# Patient Record
Sex: FEMALE | Race: WHITE | NOT HISPANIC OR LATINO | Employment: UNEMPLOYED | URBAN - METROPOLITAN AREA
[De-identification: names, ages, dates, MRNs, and addresses within clinical notes are randomized per-mention and may not be internally consistent; named-entity substitution may affect disease eponyms.]

---
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
## 2024-01-08 ENCOUNTER — OFFICE VISIT (OUTPATIENT)
Dept: URGENT CARE | Facility: CLINIC | Age: 19
End: 2024-01-08
Payer: COMMERCIAL

## 2024-01-08 VITALS
TEMPERATURE: 97.8 F | RESPIRATION RATE: 18 BRPM | DIASTOLIC BLOOD PRESSURE: 61 MMHG | OXYGEN SATURATION: 98 % | SYSTOLIC BLOOD PRESSURE: 125 MMHG | HEART RATE: 85 BPM

## 2024-01-08 DIAGNOSIS — B00.1 COLD SORE: Primary | ICD-10-CM

## 2024-01-08 PROCEDURE — 99213 OFFICE O/P EST LOW 20 MIN: CPT

## 2024-01-08 RX ORDER — VALACYCLOVIR HYDROCHLORIDE 1 G/1
2000 TABLET, FILM COATED ORAL 2 TIMES DAILY
Qty: 4 TABLET | Refills: 0 | Status: SHIPPED | OUTPATIENT
Start: 2024-01-08 | End: 2024-01-09

## 2024-01-08 NOTE — PROGRESS NOTES
[facility]        NAME: Carola Gustafson is a 18 y.o. female  : 2005    MRN: [id number]  DATE: 2024  TIME: 2:45 PM    Assessment and Plan   Cold sore [B00.1]  1. Cold sore  valACYclovir (VALTREX) 1,000 mg tablet            Patient Instructions     Valacyclovir prescribed - take as directed.    Continue warm salt water gargles.    Follow-up with your PCP in 3-5 days.    Go to the ED for any severely worsening symptoms.      Chief Complaint     Chief Complaint   Patient presents with    Lip Sore     Pt states she has had a lip bump since October. No pain. Broke open on [place] and bled for 2 hours, but stopped and is increasing in size.         History of Present Illness       This is a 18-year-old female who presents with a sore to her lip that has been present since October. Seems to be getting larger. Patient states it does occasionally break open and bleed. No history of herpes labialis. No OTC treatments tried PTA.         Review of Systems   Review of Systems   Constitutional:  Negative for chills and fever.   HENT:  Positive for mouth sores. Negative for congestion, dental problem and sore throat.    Respiratory:  Negative for cough and shortness of breath.    Cardiovascular:  Negative for chest pain.   Gastrointestinal:  Negative for abdominal pain.   Neurological:  Negative for dizziness, light-headedness and headaches.         Current Medications       Current Outpatient Medications:     valACYclovir (VALTREX) 1,000 mg tablet, Take 2 tablets (2,000 mg total) by mouth 2 (two) times a day for 2 doses, Disp: 4 tablet, Rfl: 0    albuterol (ProAir HFA) 90 mcg/act inhaler, Inhale 2 puffs every 6 (six) hours as needed for wheezing or shortness of breath (Patient not taking: Reported on 2024), Disp: 8.5 g, Rfl: 0    Butalbital-APAP-Caffeine -40 MG CAPS, , Disp: , Rfl:     clobetasol propionate (CLOBEX) 0.05 % shampoo, , Disp: , Rfl:     EPINEPHrine (EPIPEN JR 2-CONCEPCION) 0.15 mg/0.3  mL SOAJ, , Disp: , Rfl:     Galcanezumab-gnlm 120 MG/ML SOAJ, Inject 120 mg under the skin every 30 (thirty) days Following the first month loading dose of 2 pens. (Patient not taking: Reported on 12/11/2023), Disp: 1 mL, Rfl: 11    methylPREDNISolone 4 MG tablet therapy pack, Use as directed on package (Patient not taking: Reported on 1/8/2024), Disp: 21 tablet, Rfl: 0    norelgestromin-ethinyl estradiol (Xulane) 150-35 MCG/24HR, APPLY ONE PATCH WEEKLY FOR 3 WEEKS (Patient not taking: Reported on 10/24/2023), Disp: , Rfl:     prochlorperazine (COMPAZINE) 10 mg tablet, Take 1 tablet (10 mg total) by mouth every 6 (six) hours as needed (Migraine) (Patient not taking: Reported on 1/8/2024), Disp: 12 tablet, Rfl: 2    rizatriptan (MAXALT) 10 mg tablet, Take 1 tablet at the onset of a migraine; may repeat once in 2 hours if needed. Max 3 days per week (Patient not taking: Reported on 12/11/2023), Disp: 12 tablet, Rfl: 2    Current Allergies     Allergies as of 01/08/2024 - Reviewed 01/08/2024   Allergen Reaction Noted    Ketorolac Hives 01/19/2018    Azithromycin  05/25/2017    Ketorocaine-l Tongue Swelling 01/25/2018    Ketorolac tromethamine Throat Swelling and Tongue Swelling 02/15/2018    Latex Hives and Blisters 01/21/2016    Medical tape Blisters 12/10/2016    Montelukast Hives and Other (See Comments) 09/11/2008    Motrin [ibuprofen] Throat Swelling 02/15/2018    Nsaids  03/07/2019    Other Other (See Comments) 09/25/2014    Sulfamethoxazole-trimethoprim GI Intolerance 09/25/2014            The following portions of the patient's history were reviewed and updated as appropriate: allergies, current medications, past family history, past medical history, past social history, past surgical history and problem list.     Past Medical History:   Diagnosis Date    ADHD (attention deficit hyperactivity disorder)     Anxiety     Asthma     Migraines        Past Surgical History:   Procedure Laterality Date     ADENOIDECTOMY      NASAL SEPTUM SURGERY      TONSILLECTOMY         No family history on file.      Medications have been verified.        Objective   /61   Pulse 85   Temp 97.8 °F (36.6 °C)   Resp 18   SpO2 98%        Physical Exam     Physical Exam  Vitals and nursing note reviewed.   Constitutional:       General: She is not in acute distress.     Appearance: She is not ill-appearing.   HENT:      Head: Normocephalic.      Mouth/Throat:      Lips: Lesions (single lesion inside right lower lip) present.      Mouth: Mucous membranes are moist.      Pharynx: Oropharynx is clear.   Cardiovascular:      Rate and Rhythm: Normal rate and regular rhythm.      Heart sounds: Normal heart sounds.   Pulmonary:      Effort: Pulmonary effort is normal.      Breath sounds: Normal breath sounds.   Musculoskeletal:         General: Normal range of motion.      Cervical back: Normal range of motion and neck supple.   Skin:     General: Skin is warm and dry.      Capillary Refill: Capillary refill takes less than 2 seconds.   Neurological:      Mental Status: She is alert and oriented to person, place, and time.

## 2024-01-08 NOTE — PATIENT INSTRUCTIONS
Valacyclovir prescribed - take as directed.    Continue warm salt water gargles.    Follow-up with your PCP in 3-5 days.    Go to the ED for any severely worsening symptoms.

## 2024-01-24 ENCOUNTER — OFFICE VISIT (OUTPATIENT)
Dept: RHEUMATOLOGY | Facility: CLINIC | Age: 19
End: 2024-01-24
Payer: COMMERCIAL

## 2024-01-24 VITALS
OXYGEN SATURATION: 99 % | HEIGHT: 63 IN | WEIGHT: 167 LBS | BODY MASS INDEX: 29.59 KG/M2 | HEART RATE: 78 BPM | SYSTOLIC BLOOD PRESSURE: 112 MMHG | DIASTOLIC BLOOD PRESSURE: 84 MMHG

## 2024-01-24 DIAGNOSIS — R76.8 POSITIVE ANA (ANTINUCLEAR ANTIBODY): ICD-10-CM

## 2024-01-24 PROCEDURE — 99204 OFFICE O/P NEW MOD 45 MIN: CPT | Performed by: INTERNAL MEDICINE

## 2024-01-24 NOTE — PROGRESS NOTES
This is a Rheumatology Consult seen at the request of Dr. Pulliam      HPI: Carola Gustafson is a 19 y/o female who presents for further evaluation low titer MARCO A 1:40. She has past medical history asthma, migraine headaches,       Per mom she was born premature and since childhood she has frequent respiratory infections with bronchitis and pneumonia.     The headaches started couple years ago.    + fatigue    + nasal stuffiness and post nasal drip    + hand tremors    Diffuse myalgias. Not persistent     Denies weakness, dysphagia to solids or liquids, no current rashes, Raynaud's, sicca symptoms, renal failure, pleural-pericardial effusion, thrombotic events          --------------------------------------------------------------------------------------------------------        ROS:        All other ROS was reviewed and negative except as above         --------------------------------------------------------------------------------------------------------    Past Medical History    Past Medical History:   Diagnosis Date    ADHD (attention deficit hyperactivity disorder)     Anxiety     Asthma     Migraines            Past Surgical History    Past Surgical History:   Procedure Laterality Date    ADENOIDECTOMY      NASAL SEPTUM SURGERY      TONSILLECTOMY             Family History     Great grandmother RA  Grandmother fibromyalgia        Social History    Social History     Tobacco Use    Smoking status: Never     Passive exposure: Yes    Smokeless tobacco: Never   Vaping Use    Vaping status: Never Used   Substance Use Topics    Alcohol use: Never    Drug use: Never     Nursing student      Allergies    Allergies   Allergen Reactions    Ketorolac Hives     Other reaction(s): Edema-Airway  Given in an Outside ED - High dose benadryl given     Azithromycin      Other reaction(s): nausea and vomiting    Ketorocaine-L Tongue Swelling     Other reaction(s): tongue swelling    Ketorolac Tromethamine Throat Swelling and Tongue  "Swelling    Latex Hives and Blisters    Medical Tape Blisters     Other reaction(s): blisters    Montelukast Hives and Other (See Comments)    Motrin [Ibuprofen] Throat Swelling    Nsaids     Other Other (See Comments)     Adhesives, gets Blisters    Sulfamethoxazole-Trimethoprim GI Intolerance         Medications    Current Outpatient Medications   Medication Instructions    albuterol (ProAir HFA) 90 mcg/act inhaler 2 puffs, Inhalation, Every 6 hours PRN    Butalbital-APAP-Caffeine -40 MG CAPS     clobetasol propionate (CLOBEX) 0.05 % shampoo No dose, route, or frequency recorded.    EPINEPHrine (EPIPEN JR 2-CONCEPCION) 0.15 mg/0.3 mL SOAJ     Galcanezumab-gnlm 120 mg, Subcutaneous, Every 30 days, Following the first month loading dose of 2 pens.    methylPREDNISolone 4 MG tablet therapy pack Use as directed on package    norelgestromin-ethinyl estradiol (Xulane) 150-35 MCG/24HR APPLY ONE PATCH WEEKLY FOR 3 WEEKS    prochlorperazine (COMPAZINE) 10 mg, Oral, Every 6 hours PRN    rizatriptan (MAXALT) 10 mg tablet Take 1 tablet at the onset of a migraine; may repeat once in 2 hours if needed. Max 3 days per week    valACYclovir (VALTREX) 2,000 mg, Oral, 2 times daily          Physical Exam    /84   Pulse 78   Ht 5' 3\" (1.6 m)   Wt 75.8 kg (167 lb)   SpO2 99%   BMI 29.58 kg/m²     GEN: AAO, No apparent distress.  Patient is well developed.  HEENT:  Pupils are equal, round and reactive.  Sclera are clear.  Fundoscopic exam is normal.  External ears are without lesions.  Oral pharynx is clear of ulcers or other lesions.  MMM.   NECK:  Supple.  There is no adenopathy appreciable in anterior or posterior cervical chains or supraclavicularly.  JVP is normal.    HEART: Regular rate and rhythm.  There is no appreciable murmur, gallop or rub.  LUNGS: Clear to auscultation.  ABD:  Soft, without tenderness, rebound or guarding.  No appreciable organomegally.  NEURO: Speech and cognition are normal.  Strength is 5/5 " throughout.  Tone is normal.  DTRs are 2/4 at the knees, ankles and elbows.  Gait is normal.  SKIN: There are no rashes or lesions    MUSCULOSKELETAL:   -Hands: normal  -Wrists: normal  -Elbows: normal  -Shoulders: normal  -Neck: normal  -Back: normal  -Hips: normal  -Knees: normal  -Ankles: normal  -Feet: normal      ________________________________________________________________________          Results Review       9/29/2023   MARCO A Titer 1 Titer of 40    MARCO A Pattern 1 Un-typeable pattern          Impressions     Migraine headaches  Recurrent respiratory infections  Low titer MARCO A      Plans:    Carola Gustafson is a 19 y/o female with h/o migraine headaches, recurrent infections  She has a low titer MARCO A 1:40  On exam no rashes, Raynaud's, swollen or tender joints, telangiectasias, purpura etc  No other s/s suggestive inflammatory arthritis/CTD  MARCO A 1:40 is usually considered negative   Would recommend evaluation per immunology with h/o recurrent infections per discretion primary provider    RTC as needed          CC:     Thank you for involving me in this patient's care.        Jayro Galvan MD  Department of Rheumatology  [facility]

## 2024-03-27 ENCOUNTER — OFFICE VISIT (OUTPATIENT)
Dept: OBGYN CLINIC | Facility: CLINIC | Age: 19
End: 2024-03-27
Payer: COMMERCIAL

## 2024-03-27 VITALS
BODY MASS INDEX: 28.75 KG/M2 | HEIGHT: 64 IN | WEIGHT: 168.4 LBS | SYSTOLIC BLOOD PRESSURE: 114 MMHG | DIASTOLIC BLOOD PRESSURE: 76 MMHG

## 2024-03-27 DIAGNOSIS — Z01.419 GYNECOLOGIC EXAM NORMAL: Primary | ICD-10-CM

## 2024-03-27 DIAGNOSIS — N89.8 VAGINAL DISCHARGE: ICD-10-CM

## 2024-03-27 PROCEDURE — 87070 CULTURE OTHR SPECIMN AEROBIC: CPT | Performed by: PHYSICIAN ASSISTANT

## 2024-03-27 PROCEDURE — S0610 ANNUAL GYNECOLOGICAL EXAMINA: HCPCS | Performed by: PHYSICIAN ASSISTANT

## 2024-03-27 NOTE — ASSESSMENT & PLAN NOTE
Pap guidelines reviewed. Will start pap smears at age 20.   Reviewed patient's prior [facility] records. Reassured patient nothing in the records or previous test results that indicate she is infertile or would have a difficult time getting pregnant. Reassuring that menses are regular at this time.   Reviewed that if there was a concern for endometriosis that it can sometimes cause scarring increasing the possibility of infertility. At this time patient has minimal tolerable cramping with menses. Will continue to monitor.   Declines birth control options will continue barrier methods at this time.   Return to office for annual or as needed.

## 2024-03-27 NOTE — PROGRESS NOTES
Assessment/Plan   Problem List Items Addressed This Visit          Obstetrics/Gynecology    Gynecologic exam normal - Primary     Pap guidelines reviewed. Will start pap smears at age 20.   Reviewed patient's prior [facility] records. Reassured patient nothing in the records or previous test results that indicate she is infertile or would have a difficult time getting pregnant. Reassuring that menses are regular at this time.   Reviewed that if there was a concern for endometriosis that it can sometimes cause scarring increasing the possibility of infertility. At this time patient has minimal tolerable cramping with menses. Will continue to monitor.   Declines birth control options will continue barrier methods at this time.   Return to office for annual or as needed.             Other    Vaginal discharge     Reviewed discharge. Reassuring no other symptoms of itching or odor. Genital culture done today.   For prevention: Recommend acidophilus or yogurt daily, avoid irritating soaps or lotions, no tight fitted pants or underwear, avoid bubble baths, do not stay in wet swimsuit.  Recommend continuing to monitor.          Relevant Orders    Genital Comprehensive Culture       Subjective:     Patient ID: Carola Gustafson is a 18 y.o. y.o. female.    HPI  17 yo seen for annual exam.   Menarche age 9. Reports had very heavy bleeding leading to anemia. Was on continuous birth control.   Has been on pills, patch, shot.   Menses are regular off birth control. 1 day of heavy bleeding and cramps and then slows down from there. First day changing tampons every 2-3 hours on the heaviest days. Last total of 5-7 days, after first day much lighter. Cramps for first day and before menses. Tolerable doesn't need to take anything for it.   Sexually active currently. Currently using NFP. Long distance while in college. Uses condoms when more sexually active.     Mother with hx of endometriosis.   Reports was told in the past that she likely  wouldn't be able to have children. Per records no indication of concern for infertility. VWB panel and other bleeding disorder work up was negative. Prior pelvic ultrasound normal. Heavy irregular bleeding was thought to be secondary to immature hypothalamic-pituitary-ovarian access and anovulatory cycles.   Last pap: N/A.   The following portions of the patient's history were reviewed and updated as appropriate: She  has a past medical history of ADHD (attention deficit hyperactivity disorder), Anemia, Anxiety, Asthma, Clotting disorder (HCC), and Migraines.  She   Patient Active Problem List    Diagnosis Date Noted    Gynecologic exam normal 03/27/2024    Vaginal discharge 03/27/2024    Chronic migraine without aura without status migrainosus, not intractable 09/10/2023    Strep pharyngitis 08/29/2023    ADHD (attention deficit hyperactivity disorder)     Asthma     Anxiety      She  has a past surgical history that includes Tonsillectomy; ADENOIDECTOMY; and Nasal septum surgery.  Her family history includes Asthma in her father and paternal grandmother; Diabetes in her maternal grandfather; Migraines in her mother and paternal grandmother; Miscarriages / Stillbirths in her maternal grandmother; Pulmonary embolism in her maternal grandmother; Seizures in her mother; Stroke in her father.  She  reports that she has never smoked. She has been exposed to tobacco smoke. She has never used smokeless tobacco. She reports that she does not drink alcohol and does not use drugs.  Current Outpatient Medications   Medication Sig Dispense Refill    albuterol (ProAir HFA) 90 mcg/act inhaler Inhale 2 puffs every 6 (six) hours as needed for wheezing or shortness of breath (Patient taking differently: Inhale 2 puffs if needed for wheezing or shortness of breath) 8.5 g 0    rizatriptan (MAXALT) 10 mg tablet Take 1 tablet at the onset of a migraine; may repeat once in 2 hours if needed. Max 3 days per week 12 tablet 2     No  "current facility-administered medications for this visit.     She is allergic to ketorolac, azithromycin, ketorocaine-l, ketorolac tromethamine, latex, medical tape, montelukast, motrin [ibuprofen], nsaids, other, and sulfamethoxazole-trimethoprim..    Menstrual History:  OB History          0    Para   0    Term   0       0    AB   0    Living   0         SAB   0    IAB   0    Ectopic   0    Multiple   0    Live Births   0                Menarche age: 9  Patient's last menstrual period was 2024 (approximate).         Review of Systems   Constitutional:  Negative for fatigue, fever and unexpected weight change.   HENT:  Negative for dental problem and sinus pressure.    Eyes:  Negative for visual disturbance.   Respiratory:  Negative for cough, shortness of breath and wheezing.    Cardiovascular:  Negative for chest pain.   Gastrointestinal:  Negative for abdominal pain, blood in stool, constipation, diarrhea, nausea and vomiting.   Endocrine: Negative for polydipsia.   Genitourinary:  Negative for difficulty urinating, dyspareunia, dysuria, frequency, hematuria, pelvic pain and urgency.   Musculoskeletal:  Negative for arthralgias and back pain.   Neurological:  Negative for dizziness, seizures, light-headedness and headaches.   Psychiatric/Behavioral:  Negative for suicidal ideas. The patient is not nervous/anxious.        Objective:  Vitals:    24 0919   BP: 114/76   BP Location: Left arm   Patient Position: Sitting   Cuff Size: Standard   Weight: 76.4 kg (168 lb 6.4 oz)   Height: 5' 4\" (1.626 m)      Physical Exam  Constitutional:       Appearance: Normal appearance. She is well-developed.   Genitourinary:      Vulva and bladder normal.      No lesions in the vagina.      Right Labia: No rash, tenderness, lesions or skin changes.     Left Labia: No tenderness, lesions, skin changes or rash.     No labial fusion noted.      No inguinal adenopathy present in the right or left side.     " No vaginal discharge, erythema, tenderness or bleeding.        Right Adnexa: not tender, not full and no mass present.     Left Adnexa: not tender, not full and no mass present.     No cervical motion tenderness, discharge or lesion.      Uterus is not enlarged, tender or irregular.      No uterine mass detected.     No urethral prolapse, tenderness or mass present.      Bladder is not tender.    Breasts:     Breasts are symmetrical.      Right: No swelling, bleeding, inverted nipple, mass, nipple discharge, skin change or tenderness.      Left: No swelling, bleeding, inverted nipple, mass, nipple discharge, skin change or tenderness.   HENT:      Head: Normocephalic and atraumatic.   Neck:      Thyroid: No thyromegaly.   Cardiovascular:      Rate and Rhythm: Normal rate and regular rhythm.      Heart sounds: Normal heart sounds. No murmur heard.     No friction rub. No gallop.   Pulmonary:      Effort: Pulmonary effort is normal. No respiratory distress.      Breath sounds: Normal breath sounds. No wheezing.   Abdominal:      General: There is no distension.      Palpations: Abdomen is soft. There is no mass.      Tenderness: There is no abdominal tenderness. There is no guarding or rebound.      Hernia: No hernia is present.   Lymphadenopathy:      Cervical: No cervical adenopathy.      Upper Body:      Right upper body: No supraclavicular, axillary or pectoral adenopathy.      Left upper body: No supraclavicular, axillary or pectoral adenopathy.      Lower Body: No right inguinal adenopathy. No left inguinal adenopathy.   Neurological:      Mental Status: She is alert and oriented to person, place, and time.   Skin:     General: Skin is warm and dry.   Psychiatric:         Behavior: Behavior normal.

## 2024-03-27 NOTE — ASSESSMENT & PLAN NOTE
Reviewed discharge. Reassuring no other symptoms of itching or odor. Genital culture done today.   For prevention: Recommend acidophilus or yogurt daily, avoid irritating soaps or lotions, no tight fitted pants or underwear, avoid bubble baths, do not stay in wet swimsuit.  Recommend continuing to monitor.

## 2024-03-31 LAB — BACTERIA GENITAL AEROBE CULT: NORMAL

## 2024-05-01 PROBLEM — Z01.419 GYNECOLOGIC EXAM NORMAL: Status: RESOLVED | Noted: 2024-03-27 | Resolved: 2024-05-01

## 2024-05-07 ENCOUNTER — TELEPHONE (OUTPATIENT)
Dept: OTHER | Facility: OTHER | Age: 19
End: 2024-05-07

## 2024-05-07 NOTE — TELEPHONE ENCOUNTER
Patient called and is asking if the office can give her a call to schedule a new patient appointment.

## 2024-05-10 ENCOUNTER — OFFICE VISIT (OUTPATIENT)
Dept: URGENT CARE | Facility: CLINIC | Age: 19
End: 2024-05-10
Payer: COMMERCIAL

## 2024-05-10 VITALS
RESPIRATION RATE: 16 BRPM | DIASTOLIC BLOOD PRESSURE: 64 MMHG | HEIGHT: 64 IN | WEIGHT: 168 LBS | OXYGEN SATURATION: 99 % | TEMPERATURE: 97.3 F | SYSTOLIC BLOOD PRESSURE: 102 MMHG | BODY MASS INDEX: 28.68 KG/M2 | HEART RATE: 83 BPM

## 2024-05-10 DIAGNOSIS — H65.06 RECURRENT ACUTE SEROUS OTITIS MEDIA OF BOTH EARS: Primary | ICD-10-CM

## 2024-05-10 PROCEDURE — S9083 URGENT CARE CENTER GLOBAL: HCPCS | Performed by: FAMILY MEDICINE

## 2024-05-10 PROCEDURE — G0382 LEV 3 HOSP TYPE B ED VISIT: HCPCS | Performed by: FAMILY MEDICINE

## 2024-05-10 RX ORDER — AMOXICILLIN AND CLAVULANATE POTASSIUM 875; 125 MG/1; MG/1
1 TABLET, FILM COATED ORAL EVERY 12 HOURS SCHEDULED
Qty: 20 TABLET | Refills: 0 | Status: SHIPPED | OUTPATIENT
Start: 2024-05-10 | End: 2024-05-20

## 2024-05-10 NOTE — PROGRESS NOTES
[facility]        NAME: Carola Gustafson is a 19 y.o. female  : 2005    MRN: [id number]  DATE: May 10, 2024  TIME: 3:21 PM    Assessment and Plan   Recurrent acute serous otitis media of both ears [H65.06]  1. Recurrent acute serous otitis media of both ears  amoxicillin-clavulanate (AUGMENTIN) 875-125 mg per tablet            Patient Instructions       Follow up with PCP in 3-5 days.  Proceed to  ER if symptoms worsen.    If tests have been performed at [facility], our office will contact you with results if changes need to be made to the care plan discussed with you at the visit.  You can review your full results on [facility].    Chief Complaint     Chief Complaint   Patient presents with    Earache     Pt reports bilateral ear pain with onset . States was seen in UC on  and prescribed Amoxicillin for Strep A and bilateral ear infection. Pt states completed antibiotic on Wednesday without resolution of ear pain.          History of Present Illness       19-year-old female with 1 to 2-week history of bilateral ear pain.  She reports recently completing a course of amoxicillin for strep pharyngitis however notes no relief of her ear pain symptoms.  She continues to experience pain and difficulty hearing of both ears.    Earache         Review of Systems   Review of Systems   Constitutional: Negative.    HENT:  Positive for ear pain.    Eyes: Negative.    Respiratory: Negative.     Cardiovascular: Negative.    Gastrointestinal: Negative.    Genitourinary: Negative.    Skin: Negative.    Allergic/Immunologic: Negative.    Neurological: Negative.    Hematological: Negative.    Psychiatric/Behavioral: Negative.           Current Medications       Current Outpatient Medications:     amoxicillin-clavulanate (AUGMENTIN) 875-125 mg per tablet, Take 1 tablet by mouth every 12 (twelve) hours for 10 days, Disp: 20 tablet, Rfl: 0    albuterol (ProAir HFA) 90 mcg/act inhaler, Inhale 2 puffs every 6  "(six) hours as needed for wheezing or shortness of breath (Patient not taking: Reported on 5/10/2024), Disp: 8.5 g, Rfl: 0    rizatriptan (MAXALT) 10 mg tablet, Take 1 tablet at the onset of a migraine; may repeat once in 2 hours if needed. Max 3 days per week (Patient not taking: Reported on 5/10/2024), Disp: 12 tablet, Rfl: 2    Current Allergies     Allergies as of 05/10/2024 - Reviewed 05/10/2024   Allergen Reaction Noted    Ketorolac Hives 01/19/2018    Azithromycin  05/25/2017    Ketorocaine-l Tongue Swelling 01/25/2018    Ketorolac tromethamine Throat Swelling and Tongue Swelling 02/15/2018    Latex Hives and Blisters 01/21/2016    Medical tape Blisters 12/10/2016    Montelukast Hives and Other (See Comments) 09/11/2008    Motrin [ibuprofen] Throat Swelling 02/15/2018    Nsaids  03/07/2019    Other Other (See Comments) 09/25/2014    Sulfamethoxazole-trimethoprim GI Intolerance 09/25/2014            The following portions of the patient's history were reviewed and updated as appropriate: allergies, current medications, past family history, past medical history, past social history, past surgical history and problem list.     Past Medical History:   Diagnosis Date    ADHD (attention deficit hyperactivity disorder)     Anemia     Anxiety     Asthma     Clotting disorder (HCC)     Migraines        Past Surgical History:   Procedure Laterality Date    ADENOIDECTOMY      NASAL SEPTUM SURGERY      TONSILLECTOMY         Family History   Problem Relation Age of Onset    Migraines Mother     Seizures Mother     Asthma Father     Stroke Father     Diabetes Maternal Grandfather     Pulmonary embolism Maternal Grandmother     Miscarriages / Stillbirths Maternal Grandmother     Asthma Paternal Grandmother     Migraines Paternal Grandmother          Medications have been verified.        Objective   /64 (BP Location: Right arm, Patient Position: Sitting)   Pulse 83   Temp (!) 97.3 °F (36.3 °C)   Resp 16   Ht 5' 4\" " (1.626 m)   Wt 76.2 kg (168 lb)   SpO2 99%   BMI 28.84 kg/m²   No LMP recorded.       Physical Exam     Physical Exam  Vitals and nursing note reviewed.   Constitutional:       Appearance: She is well-developed.   HENT:      Head: Normocephalic.      Right Ear: Tympanic membrane is injected.      Left Ear: Tympanic membrane is injected.      Nose: Nose normal.      Mouth/Throat:      Pharynx: No oropharyngeal exudate or posterior oropharyngeal erythema.   Eyes:      Pupils: Pupils are equal, round, and reactive to light.   Cardiovascular:      Rate and Rhythm: Normal rate.   Pulmonary:      Effort: Pulmonary effort is normal.   Abdominal:      General: Abdomen is flat.   Musculoskeletal:         General: Normal range of motion.      Cervical back: Normal range of motion.   Skin:     General: Skin is warm and dry.   Neurological:      Mental Status: She is alert and oriented to person, place, and time.

## 2024-06-09 PROBLEM — H65.06 RECURRENT ACUTE SEROUS OTITIS MEDIA OF BOTH EARS: Status: RESOLVED | Noted: 2024-05-10 | Resolved: 2024-06-09

## 2024-06-13 ENCOUNTER — OFFICE VISIT (OUTPATIENT)
Dept: URGENT CARE | Facility: CLINIC | Age: 19
End: 2024-06-13
Payer: COMMERCIAL

## 2024-06-13 VITALS
SYSTOLIC BLOOD PRESSURE: 110 MMHG | RESPIRATION RATE: 16 BRPM | HEART RATE: 84 BPM | TEMPERATURE: 99.4 F | OXYGEN SATURATION: 100 % | DIASTOLIC BLOOD PRESSURE: 73 MMHG

## 2024-06-13 DIAGNOSIS — H70.91 MASTOIDITIS OF RIGHT SIDE: Primary | ICD-10-CM

## 2024-06-13 PROCEDURE — S9083 URGENT CARE CENTER GLOBAL: HCPCS | Performed by: FAMILY MEDICINE

## 2024-06-13 PROCEDURE — G0382 LEV 3 HOSP TYPE B ED VISIT: HCPCS | Performed by: FAMILY MEDICINE

## 2024-06-13 RX ORDER — METHYLPREDNISOLONE 4 MG/1
TABLET ORAL
Qty: 21 TABLET | Refills: 0 | Status: SHIPPED | OUTPATIENT
Start: 2024-06-13

## 2024-06-13 RX ORDER — AMOXICILLIN AND CLAVULANATE POTASSIUM 875; 125 MG/1; MG/1
1 TABLET, FILM COATED ORAL EVERY 12 HOURS SCHEDULED
Qty: 20 TABLET | Refills: 0 | Status: SHIPPED | OUTPATIENT
Start: 2024-06-13 | End: 2024-06-23

## 2024-06-13 NOTE — PROGRESS NOTES
[facility]        NAME: Carola Gustafson is a 19 y.o. female  : 2005    MRN: [id number]  DATE: 2024  TIME: 3:02 PM    Assessment and Plan   Mastoiditis of right side [H70.91]  1. Mastoiditis of right side  amoxicillin-clavulanate (AUGMENTIN) 875-125 mg per tablet    methylPREDNISolone 4 MG tablet therapy pack            Patient Instructions       Follow up with PCP in 3-5 days.  Proceed to  ER if symptoms worsen.    If tests have been performed at [facility], our office will contact you with results if changes need to be made to the care plan discussed with you at the visit.  [facility]    Chief Complaint     Chief Complaint   Patient presents with    Earache     B/l ear pain and sore throat x7 days.          History of Present Illness       19-year-old female presenting with recurrent bilateral ear pain for the past 1 week.  Pain in her right ear seems to be worse and does radiate to the posterior aspect of the right ear.  She also notes of a sore throat and some pain with swallowing.  Denies any fevers or chills.        Review of Systems   Review of Systems   Constitutional: Negative.    HENT:  Positive for ear pain and sore throat.    Eyes: Negative.    Respiratory: Negative.     Cardiovascular: Negative.    Gastrointestinal: Negative.    Genitourinary: Negative.    Musculoskeletal:  Positive for myalgias.   Skin: Negative.    Allergic/Immunologic: Negative.    Neurological: Negative.    Hematological: Negative.    Psychiatric/Behavioral: Negative.           Current Medications       Current Outpatient Medications:     amoxicillin-clavulanate (AUGMENTIN) 875-125 mg per tablet, Take 1 tablet by mouth every 12 (twelve) hours for 10 days, Disp: 20 tablet, Rfl: 0    methylPREDNISolone 4 MG tablet therapy pack, Use as directed on package, Disp: 21 tablet, Rfl: 0    albuterol (ProAir HFA) 90 mcg/act inhaler, Inhale 2 puffs every 6 (six) hours as needed  for wheezing or shortness of breath (Patient not taking: Reported on 5/10/2024), Disp: 8.5 g, Rfl: 0    rizatriptan (MAXALT) 10 mg tablet, Take 1 tablet at the onset of a migraine; may repeat once in 2 hours if needed. Max 3 days per week (Patient not taking: Reported on 5/10/2024), Disp: 12 tablet, Rfl: 2    Current Allergies     Allergies as of 06/13/2024 - Reviewed 06/13/2024   Allergen Reaction Noted    Ketorolac Hives 01/19/2018    Azithromycin  05/25/2017    Ketorocaine-l Tongue Swelling 01/25/2018    Ketorolac tromethamine Throat Swelling and Tongue Swelling 02/15/2018    Latex Hives and Blisters 01/21/2016    Medical tape Blisters 12/10/2016    Montelukast Hives and Other (See Comments) 09/11/2008    Motrin [ibuprofen] Throat Swelling 02/15/2018    Nsaids  03/07/2019    Other Other (See Comments) 09/25/2014    Sulfamethoxazole-trimethoprim GI Intolerance 09/25/2014            The following portions of the patient's history were reviewed and updated as appropriate: allergies, current medications, past family history, past medical history, past social history, past surgical history and problem list.     Past Medical History:   Diagnosis Date    ADHD (attention deficit hyperactivity disorder)     Anemia     Anxiety     Asthma     Clotting disorder (HCC)     Migraines        Past Surgical History:   Procedure Laterality Date    ADENOIDECTOMY      NASAL SEPTUM SURGERY      TONSILLECTOMY         Family History   Problem Relation Age of Onset    Migraines Mother     Seizures Mother     Asthma Father     Stroke Father     Diabetes Maternal Grandfather     Pulmonary embolism Maternal Grandmother     Miscarriages / Stillbirths Maternal Grandmother     Asthma Paternal Grandmother     Migraines Paternal Grandmother          Medications have been verified.        Objective   /73   Pulse 84   Temp 99.4 °F (37.4 °C)   Resp 16   SpO2 100%   No LMP recorded.       Physical Exam     Physical Exam  Vitals and nursing  note reviewed.   Constitutional:       Appearance: She is well-developed.   HENT:      Head: Normocephalic.      Right Ear: Tympanic membrane is erythematous. Tympanic membrane is not bulging.      Left Ear: Tympanic membrane is not erythematous or bulging.      Nose: Nose normal.   Eyes:      Pupils: Pupils are equal, round, and reactive to light.   Cardiovascular:      Rate and Rhythm: Normal rate.   Pulmonary:      Effort: Pulmonary effort is normal.   Abdominal:      General: Abdomen is flat.   Musculoskeletal:         General: Normal range of motion.      Cervical back: Normal range of motion.   Skin:     General: Skin is warm and dry.   Neurological:      Mental Status: She is alert and oriented to person, place, and time.

## 2024-06-25 PROBLEM — N89.8 VAGINAL DISCHARGE: Status: RESOLVED | Noted: 2024-03-27 | Resolved: 2024-06-25

## 2024-06-25 PROBLEM — J02.0 STREP PHARYNGITIS: Status: RESOLVED | Noted: 2023-08-29 | Resolved: 2024-06-25

## 2024-09-26 ENCOUNTER — OFFICE VISIT (OUTPATIENT)
Dept: FAMILY MEDICINE CLINIC | Facility: HOSPITAL | Age: 19
End: 2024-09-26
Payer: COMMERCIAL

## 2024-09-26 ENCOUNTER — TELEPHONE (OUTPATIENT)
Age: 19
End: 2024-09-26

## 2024-09-26 VITALS
OXYGEN SATURATION: 99 % | DIASTOLIC BLOOD PRESSURE: 70 MMHG | TEMPERATURE: 98.2 F | WEIGHT: 175 LBS | HEIGHT: 64 IN | SYSTOLIC BLOOD PRESSURE: 114 MMHG | HEART RATE: 99 BPM | BODY MASS INDEX: 29.88 KG/M2

## 2024-09-26 DIAGNOSIS — Z76.89 ESTABLISHING CARE WITH NEW DOCTOR, ENCOUNTER FOR: Primary | ICD-10-CM

## 2024-09-26 DIAGNOSIS — F41.9 ANXIETY: ICD-10-CM

## 2024-09-26 DIAGNOSIS — G43.709 CHRONIC MIGRAINE WITHOUT AURA WITHOUT STATUS MIGRAINOSUS, NOT INTRACTABLE: ICD-10-CM

## 2024-09-26 DIAGNOSIS — J45.31 MILD PERSISTENT ASTHMA WITH ACUTE EXACERBATION: ICD-10-CM

## 2024-09-26 DIAGNOSIS — Z13.0 SCREENING FOR DEFICIENCY ANEMIA: ICD-10-CM

## 2024-09-26 DIAGNOSIS — E55.9 VITAMIN D DEFICIENCY: ICD-10-CM

## 2024-09-26 DIAGNOSIS — E53.8 VITAMIN B12 DEFICIENCY: ICD-10-CM

## 2024-09-26 PROBLEM — F42.2 MIXED OBSESSIONAL THOUGHTS AND ACTS: Status: ACTIVE | Noted: 2024-09-26

## 2024-09-26 PROCEDURE — 99203 OFFICE O/P NEW LOW 30 MIN: CPT | Performed by: NURSE PRACTITIONER

## 2024-09-26 RX ORDER — PREDNISONE 10 MG/1
TABLET ORAL
Qty: 18 TABLET | Refills: 0 | Status: SHIPPED | OUTPATIENT
Start: 2024-09-26 | End: 2024-10-05

## 2024-09-26 RX ORDER — FLUOXETINE 10 MG/1
10 TABLET, FILM COATED ORAL DAILY
Qty: 90 TABLET | Refills: 3 | Status: SHIPPED | OUTPATIENT
Start: 2024-09-26

## 2024-09-26 RX ORDER — ALBUTEROL SULFATE 2.5 MG/3ML
2.5 SOLUTION RESPIRATORY (INHALATION) EVERY 6 HOURS PRN
COMMUNITY

## 2024-09-26 RX ORDER — HYDROXYZINE HYDROCHLORIDE 10 MG/1
10 TABLET, FILM COATED ORAL EVERY 6 HOURS PRN
Qty: 30 TABLET | Refills: 0 | Status: SHIPPED | OUTPATIENT
Start: 2024-09-26

## 2024-09-26 RX ORDER — ALBUTEROL SULFATE 90 UG/1
2 INHALANT RESPIRATORY (INHALATION) EVERY 6 HOURS PRN
Qty: 8.5 G | Refills: 0 | Status: SHIPPED | OUTPATIENT
Start: 2024-09-26

## 2024-09-26 NOTE — ASSESSMENT & PLAN NOTE
Hx asthma; mild with suspected reactive airway component as she is only on albuterol inhaler/neb prn.  Do not have prior records.  Unclear when last PFT was completed.      Sinus pressure/pain for the last week and feels like it is going into her chest.  Fever for a day then none.  No change in appetite.  Cough NP.  No sick contacts.  +paroxysmal dyspnea.    Using nebulizer, dayquil at first but now just nebulizer. No recent tylenol/motrin use.   -lungs CTA however tight. No acute distress.   No cough noted on exam.  Does have PND and mild ear pain with tragus pull however no s/s mastoiditis.  Given paroxysmal dyspnea will treat with prednisone taper.    -will ask for PFT once acute illness resolved.

## 2024-09-26 NOTE — ASSESSMENT & PLAN NOTE
Hx migraines with photophobia/phonophobia.  Well controlled; denies migraine for the past 2-3 months.  She takes B12 and magnesium for prophylaxis.  Has maxalt with tylenol as she has an allergy to NSAIDs and sleeps for abortive therapy.    -followed by neurology

## 2024-09-26 NOTE — PROGRESS NOTES
"  History of Present Illness   Well Adult Physical   Patient here for a comprehensive physical exam.    HPI      Diet and Physical Activity  Diet: {diet; well adult:23777}  Weight concerns: {weight concerns; well adult:86854}  Exercise: {exericse; well adult:01118}   EtOH: {rare/occasional/daily/social/none:80722::\"***\":1}     Depression Screen  PHQ-2/9 Depression Screening    Little interest or pleasure in doing things: 0 - not at all  Feeling down, depressed, or hopeless: 1 - several days          General Health  Hearing: {WELL ADULT HEARIN}  Vision: {vision; well adult:95965}  Dental: {dental; well adult:89060}     History:  LMP: No LMP recorded.  Menopause at *** years  : ***  Para: ***    Cancer Screening  Colononoscopy ***  Mammogram ***   Pap ***  Abnormal pap? {yes/no:9010}  Smoker *** Annual screening with low-dose helical computed tomography (CT) for patients age 55 to 74 years with history of smoking at least 30 pack-years and, if a former smoker, had quit within the previous 15 years         The following portions of the patient's history were reviewed and updated as appropriate: allergies, current medications, past family history, past medical history, past social history, past surgical history and problem list.    Review of Systems     Review of Systems   Constitutional: Negative.  Negative for activity change, appetite change, chills, fatigue and fever.   HENT:  Positive for congestion, postnasal drip and sinus pressure. Negative for ear pain, sinus pain and sore throat.    Eyes: Negative.    Respiratory:  Positive for cough, chest tightness, shortness of breath and wheezing.    Cardiovascular: Negative.  Negative for chest pain and leg swelling.   Gastrointestinal: Negative.  Negative for constipation and diarrhea.   Endocrine: Negative.    Genitourinary: Negative.  Negative for dysuria.   Musculoskeletal: Negative.    Skin: Negative.    Allergic/Immunologic: Negative.  Negative for " immunocompromised state.   Neurological: Negative.  Negative for dizziness and light-headedness.   Hematological: Negative.    Psychiatric/Behavioral:  Positive for sleep disturbance.        Past Medical History     Past Medical History:   Diagnosis Date   • ADHD (attention deficit hyperactivity disorder)    • Anemia    • Anxiety    • Asthma    • Clotting disorder (HCC)    • Migraines        Past Surgical History     Past Surgical History:   Procedure Laterality Date   • ADENOIDECTOMY     • NASAL SEPTUM SURGERY     • TONSILLECTOMY         Social History     Social History     Socioeconomic History   • Marital status: Single     Spouse name: Not on file   • Number of children: Not on file   • Years of education: Not on file   • Highest education level: Not on file   Occupational History   • Not on file   Tobacco Use   • Smoking status: Never     Passive exposure: Yes   • Smokeless tobacco: Never   Vaping Use   • Vaping status: Never Used   Substance and Sexual Activity   • Alcohol use: Never   • Drug use: Never   • Sexual activity: Yes     Partners: Male     Birth control/protection: Rhythm   Other Topics Concern   • Not on file   Social History Narrative   • Not on file     Social Determinants of Health     Financial Resource Strain: Not on file   Food Insecurity: Not on file   Transportation Needs: Not on file   Physical Activity: Not on file   Stress: Not on file   Social Connections: Unknown (6/18/2024)    Received from [facility]    Social Connections    • How often do you feel lonely or isolated from those around you? (Adult - for ages 18 years and over): Not on file   Intimate Partner Violence: Not on file   Housing Stability: Not on file       Family History     Family History   Problem Relation Age of Onset   • Migraines Mother    • Seizures Mother    • Asthma Father    • Stroke Father    • Diabetes Maternal Grandfather    • Pulmonary embolism Maternal Grandmother    • Miscarriages / Stillbirths Maternal  Grandmother    • Asthma Paternal Grandmother    • Migraines Paternal Grandmother        Current Medications       Current Outpatient Medications:   •  albuterol (ProAir HFA) 90 mcg/act inhaler, Inhale 2 puffs every 6 (six) hours as needed for wheezing or shortness of breath (Patient not taking: Reported on 5/10/2024), Disp: 8.5 g, Rfl: 0  •  methylPREDNISolone 4 MG tablet therapy pack, Use as directed on package, Disp: 21 tablet, Rfl: 0  •  rizatriptan (MAXALT) 10 mg tablet, Take 1 tablet at the onset of a migraine; may repeat once in 2 hours if needed. Max 3 days per week (Patient not taking: Reported on 5/10/2024), Disp: 12 tablet, Rfl: 2     Allergies     Allergies   Allergen Reactions   • Ketorolac Hives     Other reaction(s): Edema-Airway  Given in an Outside ED - High dose benadryl given    • Azithromycin      Other reaction(s): nausea and vomiting   • Ketorocaine-L Tongue Swelling     Other reaction(s): tongue swelling   • Ketorolac Tromethamine Throat Swelling and Tongue Swelling   • Latex Hives and Blisters   • Medical Tape Blisters     Other reaction(s): blisters   • Montelukast Hives and Other (See Comments)   • Motrin [Ibuprofen] Throat Swelling   • Nsaids    • Other Other (See Comments)     Adhesives, gets Blisters   • Sulfamethoxazole-Trimethoprim GI Intolerance       Objective     There were no vitals taken for this visit.  Wt Readings from Last 3 Encounters:   05/10/24 76.2 kg (168 lb)   03/27/24 76.4 kg (168 lb 6.4 oz) (91%, Z= 1.34)*   01/24/24 75.8 kg (167 lb) (91%, Z= 1.31)*     * Growth percentiles are based on CDC (Girls, 2-20 Years) data.     BP Readings from Last 3 Encounters:   06/13/24 110/73   05/10/24 102/64   03/27/24 114/76     Pulse Readings from Last 3 Encounters:   06/13/24 84   05/10/24 83   01/24/24 78     There is no height or weight on file to calculate BMI.     Physical Exam  Vitals and nursing note reviewed.   Constitutional:       Appearance: Normal appearance.   HENT:       Head: Normocephalic and atraumatic.      Right Ear: Tympanic membrane, ear canal and external ear normal.      Left Ear: Tympanic membrane, ear canal and external ear normal.      Nose: Nose normal.      Mouth/Throat:      Mouth: Mucous membranes are moist.      Pharynx: Oropharynx is clear.   Eyes:      Extraocular Movements: Extraocular movements intact.      Conjunctiva/sclera: Conjunctivae normal.      Pupils: Pupils are equal, round, and reactive to light.   Cardiovascular:      Rate and Rhythm: Normal rate and regular rhythm.      Pulses: Normal pulses.      Heart sounds: Normal heart sounds.   Pulmonary:      Effort: Pulmonary effort is normal.      Breath sounds: Normal breath sounds.   Abdominal:      General: Bowel sounds are normal.      Palpations: Abdomen is soft.   Musculoskeletal:         General: Normal range of motion.      Cervical back: Normal range of motion and neck supple.   Skin:     General: Skin is warm and dry.   Neurological:      General: No focal deficit present.      Mental Status: She is alert and oriented to person, place, and time.   Psychiatric:         Mood and Affect: Mood normal.         Behavior: Behavior normal.         Thought Content: Thought content normal.         Judgment: Judgment normal.         No results found.    Health Maintenance     Health Maintenance   Topic Date Due   • Hepatitis C Screening  Never done   • Pneumococcal Vaccine: Pediatrics (0 to 5 Years) and At-Risk Patients (6 to 64 Years) (1 of 2 - PCV) Never done   • DTaP,Tdap,and Td Vaccines (1 - Tdap) Never done   • Depression Screening  Never done   • HIV Screening  Never done   • HPV Vaccine (1 - 3-dose series) Never done   • Chlamydia Screening  01/13/2021   • Annual Physical  Never done   • OT PLAN OF CARE  12/27/2023   • COVID-19 Vaccine (3 - 2023-24 season) 09/01/2024   • Influenza Vaccine (1) 09/01/2024   • Zoster Vaccine (1 of 2) 04/10/2054   • RSV Vaccine Age 60+ Years (1 - 1-dose 60+ series)  "04/10/2064   • Meningococcal ACWY Vaccine  Completed   • RSV Vaccine age 0-20 Months  Aged Out   • HIB Vaccine  Aged Out   • IPV Vaccine  Aged Out   • Hepatitis A Vaccine  Aged Out     Immunization History   Administered Date(s) Administered   • COVID-19 PFIZER VACCINE 0.3 ML IM 05/07/2021, 05/28/2021   • INFLUENZA 08/23/2016, 09/24/2018, 10/28/2019, 12/27/2021   • Meningococcal MCV4, Unspecified 08/24/2021       Laboratory Results:   Lab Results   Component Value Date    WBC 5.55 09/29/2023    WBC 10.47 (H) 05/22/2023    WBC 8.88 10/23/2021    HGB 12.7 09/29/2023    HGB 11.8 05/22/2023    HGB 13.1 10/23/2021    HCT 40.8 09/29/2023    HCT 37.5 05/22/2023    HCT 41.7 10/23/2021    MCV 85 09/29/2023    MCV 86 05/22/2023    MCV 87 10/23/2021     09/29/2023     05/22/2023     10/23/2021     Lab Results   Component Value Date    BUN 10 09/29/2023    BUN 13 05/22/2023    BUN 13 10/23/2021     Lab Results   Component Value Date    GLUC 107 05/22/2023    GLUC 83 10/23/2021    GLUC 91 10/19/2021    ALT 21 09/29/2023    ALT 7 05/22/2023    ALT 23 10/23/2021    AST 19 09/29/2023    AST 13 05/22/2023    AST 10 10/23/2021     No results found for: \"TSH\"  No results found for: \"A1C\"    Lipid Profile:   No results found for: \"CHOL\"  No results found for: \"HDL\"  No results found for: \"LDLC\"  No results found for: \"LDLCALC\"  No results found for: \"TRIG\"      Assessment/Plan   {There are no diagnoses linked to this encounter. (Refresh or delete this SmartLink)}    1. Healthy female exam.  2. Patient Counseling:   { Adult CPE Counseling List:02526}    Immunizations reviewed. ***  Discussed benefits of screening ***.  Discussed the patient's BMI with her.  The BMI {BMI plan ([facility] measure 421):59144}  3. Cancer Screening ***  4. Labs ***  5. ***  6. Follow up {follow-up interval:26819}.    TORIBIO Mendieta    "

## 2024-09-26 NOTE — PATIENT INSTRUCTIONS
Obtain medical records from prior PCP  Bring supplements (picture or bottle) to follow up.   Fluoxetine 10mg daily.  Use hydroxyzine for panic attacks.   Prednisone taper for exacerbation of asthma.   Use of inhaler with spacer.   Obtain fasting bloodwork.  No caffeine/food for eight hours prior.  Drink plenty of water.   Complete PFT

## 2024-09-26 NOTE — PROGRESS NOTES
"[facility]   Kayli ROONEY    Assessment/Plan:   1. Establishing care with new doctor, encounter for  2. Mild persistent asthma with acute exacerbation  Assessment & Plan:  Hx asthma; mild with suspected reactive airway component as she is only on albuterol inhaler/neb prn.  Do not have prior records.  Unclear when last PFT was completed.      Sinus pressure/pain for the last week and feels like it is going into her chest.  Fever for a day then none.  No change in appetite.  Cough NP.  No sick contacts.  +paroxysmal dyspnea.    Using nebulizer, dayquil at first but now just nebulizer. No recent tylenol/motrin use.   -lungs CTA however tight. No acute distress.   No cough noted on exam.  Does have PND and mild ear pain with tragus pull however no s/s mastoiditis.  Given paroxysmal dyspnea will treat with prednisone taper.    -will ask for PFT once acute illness resolved.   Orders:  -     albuterol (ProAir HFA) 90 mcg/act inhaler; Inhale 2 puffs every 6 (six) hours as needed for wheezing or shortness of breath  -     Complete PFT with post bronchodilator; Future  -     Spacer/Aero-Holding Chambers NIRU; Use in the morning  -     predniSONE 10 mg tablet; Take 3 tablets (30 mg total) by mouth daily for 3 days, THEN 2 tablets (20 mg total) daily for 3 days, THEN 1 tablet (10 mg total) daily for 3 days.  3. Anxiety  Assessment & Plan:  KAL 11    Started seeing a therapist for anxiety and noted to have OCD tendencies.  Reports she was on fluoxetine in the past but stopped it.  Did help symptoms.  Notes anxiety affects sleep as she struggles to \"shut it off\".  Gets about 4-5 hours a night of rest.    Works at [employer]; drinks 1 caffeine drink daily.  Balanced diet.  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   Likes to hike for physical activity/stress reduction.   -discussed and agreeable to restarting fluoxetine 10mg daily.  Will use hydroxyzine as needed for panic " attacks.   -continue CBT    Orders:  -     FLUoxetine 10 MG tablet; Take 1 tablet (10 mg total) by mouth daily  -     Comprehensive metabolic panel; Future  -     hydrOXYzine HCL (ATARAX) 10 mg tablet; Take 1 tablet (10 mg total) by mouth every 6 (six) hours as needed for anxiety  4. Screening for deficiency anemia  -     CBC and differential; Future  5. Vitamin D deficiency  -     Vitamin D 25 hydroxy; Future  6. Vitamin B12 deficiency  -     Vitamin B12; Future  7. Chronic migraine without aura without status migrainosus, not intractable  Assessment & Plan:  Hx migraines with photophobia/phonophobia.  Well controlled; denies migraine for the past 2-3 months.  She takes B12 and magnesium for prophylaxis.  Has maxalt with tylenol as she has an allergy to NSAIDs and sleeps for abortive therapy.    -followed by neurology      Return for Annual physical, F/U after bloodwork, F/U after Imaging.  Patient may call or return to office with any questions or concerns.     ______________________________________________________________________  Subjective:     Patient ID: Carola Gustafson is a 19 y.o. female.  Carola Gustafson  Chief Complaint   Patient presents with   • Establish Care     Here to establish care; former patient of Mateo.  Unclear when last OV was.    PMH: anxiety, asthma, migraines.  Reports hx ADHD as a child (2yr old) however did well in school and stopped medications at some point, did have an eating disorder.    Not currently  on BC.  Currently sexually active with one and only BF; uses fertility awareness method.        Unclear dosing of supplements.  Will bring bottle or dosage in for next OV.   magnesium  MVI  B2  B12  Probiotic  Vitamin D          Depression Screening and Follow-up Plan: Patient was screened for depression during today's encounter. They screened negative with a PHQ-2 score of 1.      The following portions of the patient's history were reviewed and updated as appropriate: allergies,  current medications, past family history, past medical history, past social history, past surgical history, and problem list.    Review of Systems   Constitutional: Negative.  Negative for activity change, appetite change, chills, fatigue and fever.   HENT:  Positive for congestion, ear pain, postnasal drip and sinus pressure. Negative for sinus pain and sore throat.    Eyes: Negative.    Respiratory:  Positive for cough, chest tightness, shortness of breath and wheezing.    Cardiovascular: Negative.  Negative for chest pain and leg swelling.   Gastrointestinal: Negative.  Negative for constipation and diarrhea.   Endocrine: Negative.    Genitourinary: Negative.  Negative for dysuria and menstrual problem.   Musculoskeletal: Negative.    Skin: Negative.    Allergic/Immunologic: Negative.  Negative for immunocompromised state.   Neurological:  Negative for dizziness and light-headedness.   Hematological: Negative.    Psychiatric/Behavioral:  Positive for sleep disturbance. The patient is nervous/anxious.          Objective:      Vitals:    09/26/24 1429   BP: 114/70   Pulse: 99   Temp: 98.2 °F (36.8 °C)   SpO2: 99%      Physical Exam  Vitals and nursing note reviewed.   Constitutional:       Appearance: Normal appearance.   HENT:      Head: Normocephalic and atraumatic.      Right Ear: Ear canal and external ear normal. A middle ear effusion is present.      Left Ear: Ear canal and external ear normal. A middle ear effusion is present.      Nose: Congestion and rhinorrhea present. Rhinorrhea is clear.      Mouth/Throat:      Mouth: Mucous membranes are moist.      Pharynx: Oropharynx is clear. Postnasal drip present.   Eyes:      Extraocular Movements: Extraocular movements intact.      Conjunctiva/sclera: Conjunctivae normal.      Pupils: Pupils are equal, round, and reactive to light.   Cardiovascular:      Rate and Rhythm: Normal rate and regular rhythm.      Pulses: Normal pulses.      Heart sounds: Normal heart  "sounds.   Pulmonary:      Effort: Pulmonary effort is normal.      Breath sounds: Normal breath sounds. No wheezing, rhonchi or rales.   Abdominal:      General: Bowel sounds are normal.      Palpations: Abdomen is soft.   Musculoskeletal:         General: Normal range of motion.      Cervical back: Normal range of motion and neck supple.      Right lower leg: No edema.      Left lower leg: No edema.   Skin:     General: Skin is warm and dry.      Coloration: Skin is pale.   Neurological:      General: No focal deficit present.      Mental Status: She is alert and oriented to person, place, and time.   Psychiatric:         Mood and Affect: Mood is anxious.         Speech: Speech normal.         Behavior: Behavior normal. Behavior is cooperative.         Thought Content: Thought content normal.         Judgment: Judgment normal.         Portions of the record may have been created with voice recognition software. Occasional wrong word or \"sound alike\" substitutions may have occurred due to the inherent limitations of voice recognition software. Please review the chart carefully and recognize, using context, where substitutions/typographical errors may have occurred.       "

## 2024-09-26 NOTE — TELEPHONE ENCOUNTER
Patient is calling to inform provider that her prescription for FLUoxetine 10 MG tablet the pharmacy is requiring a PA or an alternative  Please review and advise

## 2024-09-26 NOTE — ASSESSMENT & PLAN NOTE
"KAL 11    Started seeing a therapist for anxiety and noted to have OCD tendencies.  Reports she was on fluoxetine in the past but stopped it.  Did help symptoms.  Notes anxiety affects sleep as she struggles to \"shut it off\".  Gets about 4-5 hours a night of rest.    Works at [employer]; drinks 1 caffeine drink daily.  Balanced diet.  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   Likes to hike for physical activity/stress reduction.   -discussed and agreeable to restarting fluoxetine 10mg daily.  Will use hydroxyzine as needed for panic attacks.   -continue CBT    "

## 2024-09-30 NOTE — TELEPHONE ENCOUNTER
PA for FLUoxetine 10 MG tablet SUBMITTED     via    [][facility]-KEY:   [x]Ricki-Case ID #: [id number]   []Faxed to plan   []Other website   []Phone call Case ID #     Office notes sent, clinical questions answered. Awaiting determination    Turnaround time for your insurance to make a decision on your Prior Authorization can take 7-21 business days.

## 2024-10-01 NOTE — TELEPHONE ENCOUNTER
PA for FLUoxetine 10 MG tablet  APPROVED     Date(s) approved August 31, 2024 to September 30, 2025     Case # [id number]     Patient advised by          []Bancore A/Shart Message  []Phone call   [x]LMOM - cell phone voicemail  []L/M to call office as no active Communication consent on file  []Unable to leave detailed message as VM not approved on Communication consent       Pharmacy advised by    [x]Fax  []Phone call    Approval letter scanned into Media No documentation not available at time of decision

## 2024-10-09 ENCOUNTER — APPOINTMENT (OUTPATIENT)
Dept: LAB | Facility: HOSPITAL | Age: 19
End: 2024-10-09
Payer: COMMERCIAL

## 2024-10-09 DIAGNOSIS — Z13.0 SCREENING FOR DEFICIENCY ANEMIA: ICD-10-CM

## 2024-10-09 DIAGNOSIS — E53.8 VITAMIN B12 DEFICIENCY: ICD-10-CM

## 2024-10-09 DIAGNOSIS — F41.9 ANXIETY: ICD-10-CM

## 2024-10-09 DIAGNOSIS — E55.9 VITAMIN D DEFICIENCY: ICD-10-CM

## 2024-10-09 LAB
25(OH)D3 SERPL-MCNC: 38.2 NG/ML (ref 30–100)
ALBUMIN SERPL BCG-MCNC: 4.4 G/DL (ref 3.5–5)
ALP SERPL-CCNC: 83 U/L (ref 34–104)
ALT SERPL W P-5'-P-CCNC: 15 U/L (ref 7–52)
ANION GAP SERPL CALCULATED.3IONS-SCNC: 8 MMOL/L (ref 4–13)
AST SERPL W P-5'-P-CCNC: 16 U/L (ref 13–39)
BASOPHILS # BLD AUTO: 0.06 THOUSANDS/ΜL (ref 0–0.1)
BASOPHILS NFR BLD AUTO: 1 % (ref 0–1)
BILIRUB SERPL-MCNC: 0.54 MG/DL (ref 0.2–1)
BUN SERPL-MCNC: 13 MG/DL (ref 5–25)
CALCIUM SERPL-MCNC: 9.5 MG/DL (ref 8.4–10.2)
CHLORIDE SERPL-SCNC: 105 MMOL/L (ref 96–108)
CO2 SERPL-SCNC: 24 MMOL/L (ref 21–32)
CREAT SERPL-MCNC: 0.74 MG/DL (ref 0.6–1.3)
EOSINOPHIL # BLD AUTO: 0.09 THOUSAND/ΜL (ref 0–0.61)
EOSINOPHIL NFR BLD AUTO: 1 % (ref 0–6)
ERYTHROCYTE [DISTWIDTH] IN BLOOD BY AUTOMATED COUNT: 13.3 % (ref 11.6–15.1)
GFR SERPL CREATININE-BSD FRML MDRD: 116 ML/MIN/1.73SQ M
GLUCOSE SERPL-MCNC: 89 MG/DL (ref 65–140)
HCT VFR BLD AUTO: 43.6 % (ref 34.8–46.1)
HGB BLD-MCNC: 13.7 G/DL (ref 11.5–15.4)
IMM GRANULOCYTES # BLD AUTO: 0.02 THOUSAND/UL (ref 0–0.2)
IMM GRANULOCYTES NFR BLD AUTO: 0 % (ref 0–2)
LYMPHOCYTES # BLD AUTO: 4.33 THOUSANDS/ΜL (ref 0.6–4.47)
LYMPHOCYTES NFR BLD AUTO: 37 % (ref 14–44)
MCH RBC QN AUTO: 27.8 PG (ref 26.8–34.3)
MCHC RBC AUTO-ENTMCNC: 31.4 G/DL (ref 31.4–37.4)
MCV RBC AUTO: 89 FL (ref 82–98)
MONOCYTES # BLD AUTO: 0.72 THOUSAND/ΜL (ref 0.17–1.22)
MONOCYTES NFR BLD AUTO: 6 % (ref 4–12)
NEUTROPHILS # BLD AUTO: 6.5 THOUSANDS/ΜL (ref 1.85–7.62)
NEUTS SEG NFR BLD AUTO: 55 % (ref 43–75)
NRBC BLD AUTO-RTO: 0 /100 WBCS
PLATELET # BLD AUTO: 306 THOUSANDS/UL (ref 149–390)
PMV BLD AUTO: 9.8 FL (ref 8.9–12.7)
POTASSIUM SERPL-SCNC: 4.7 MMOL/L (ref 3.5–5.3)
PROT SERPL-MCNC: 7.3 G/DL (ref 6.4–8.4)
RBC # BLD AUTO: 4.92 MILLION/UL (ref 3.81–5.12)
SODIUM SERPL-SCNC: 137 MMOL/L (ref 135–147)
VIT B12 SERPL-MCNC: 952 PG/ML (ref 180–914)
WBC # BLD AUTO: 11.72 THOUSAND/UL (ref 4.31–10.16)

## 2024-10-09 PROCEDURE — 85025 COMPLETE CBC W/AUTO DIFF WBC: CPT

## 2024-10-09 PROCEDURE — 82607 VITAMIN B-12: CPT

## 2024-10-09 PROCEDURE — 82306 VITAMIN D 25 HYDROXY: CPT

## 2024-10-09 PROCEDURE — 36415 COLL VENOUS BLD VENIPUNCTURE: CPT

## 2024-10-09 PROCEDURE — 80053 COMPREHEN METABOLIC PANEL: CPT

## 2024-10-15 ENCOUNTER — TELEPHONE (OUTPATIENT)
Dept: FAMILY MEDICINE CLINIC | Facility: HOSPITAL | Age: 19
End: 2024-10-15

## 2024-10-15 NOTE — TELEPHONE ENCOUNTER
----- Message from TORIBIO La sent at 10/15/2024  7:57 AM EDT -----  B12 mildly elevated.  I believe she reported taking B12; would recommend lowering dose.

## 2024-10-15 NOTE — TELEPHONE ENCOUNTER
Patient returned call. She does not take B12. Patient would mayo like to know if she should keep her appointment for tomorrow. Please advise     CB# [phone]

## 2024-10-16 ENCOUNTER — OFFICE VISIT (OUTPATIENT)
Dept: FAMILY MEDICINE CLINIC | Facility: HOSPITAL | Age: 19
End: 2024-10-16
Payer: COMMERCIAL

## 2024-10-16 VITALS
BODY MASS INDEX: 30.38 KG/M2 | HEART RATE: 71 BPM | WEIGHT: 177 LBS | OXYGEN SATURATION: 97 % | DIASTOLIC BLOOD PRESSURE: 78 MMHG | SYSTOLIC BLOOD PRESSURE: 124 MMHG

## 2024-10-16 DIAGNOSIS — F41.9 ANXIETY: ICD-10-CM

## 2024-10-16 DIAGNOSIS — F42.2 MIXED OBSESSIONAL THOUGHTS AND ACTS: ICD-10-CM

## 2024-10-16 DIAGNOSIS — R09.82 ALLERGIC RHINITIS WITH POSTNASAL DRIP: ICD-10-CM

## 2024-10-16 DIAGNOSIS — Z23 NEED FOR INFLUENZA VACCINATION: ICD-10-CM

## 2024-10-16 DIAGNOSIS — Z00.00 WELL ADULT EXAM: Primary | ICD-10-CM

## 2024-10-16 DIAGNOSIS — J30.9 ALLERGIC RHINITIS WITH POSTNASAL DRIP: ICD-10-CM

## 2024-10-16 PROCEDURE — 90471 IMMUNIZATION ADMIN: CPT

## 2024-10-16 PROCEDURE — 99395 PREV VISIT EST AGE 18-39: CPT | Performed by: NURSE PRACTITIONER

## 2024-10-16 PROCEDURE — 90656 IIV3 VACC NO PRSV 0.5 ML IM: CPT

## 2024-10-16 RX ORDER — UREA 10 %
45 LOTION (ML) TOPICAL DAILY
COMMUNITY

## 2024-10-16 RX ORDER — HYDROXYZINE HYDROCHLORIDE 10 MG/1
10 TABLET, FILM COATED ORAL EVERY 6 HOURS PRN
Qty: 30 TABLET | Refills: 5 | Status: SHIPPED | OUTPATIENT
Start: 2024-10-16

## 2024-10-16 RX ORDER — TRIAMCINOLONE ACETONIDE 55 UG/1
1 SPRAY, METERED NASAL DAILY
Qty: 16.9 ML | Refills: 5 | Status: SHIPPED | OUTPATIENT
Start: 2024-10-16

## 2024-10-16 NOTE — ASSESSMENT & PLAN NOTE
Recent URI in September with resolution however persistent rhinitis with PND.  Denies cough/congestion/fever/chills.  No paroxysmal dyspnea.    -lungs CTA.  +rhinorrhea with PND  -will trial nasacort as directed in office.

## 2024-10-16 NOTE — ASSESSMENT & PLAN NOTE
"HX anxiety with OCD.  Has only been on fluoxetine for the past week due to prior-auth with insurance.  Reports tolerance at this time with mild nausea and less of an appetite. Sleep unchanged as she struggles to \"shut it off\" (4-5 hours night) has only used hydroxyzine once.  Trying to avoid caffeine.    -continue CBT  -continue fluoxetine with recheck in 4 weeks.  -use hydroxyzine for sleep  "

## 2024-10-16 NOTE — PROGRESS NOTES
History of Present Illness   Well Adult Physical   Patient here for a comprehensive physical exam.    Returns for physical and labwork review.       Diet and Physical Activity  Diet: well balanced diet  Weight concerns: Patient has class 1 obesity (BMI 30-34.9)  Exercise: frequently   EtOH: rare/occasional/daily/social/none: rare     Depression Screen  PHQ-2/9 Depression Screening    Little interest or pleasure in doing things: 0 - not at all  Feeling down, depressed, or hopeless: 0 - not at all  PHQ-2 Score: 0  PHQ-2 Interpretation: Negative depression screen          General Health  Hearing: Normal:  bilateral  Vision: most recent eye exam >1 year  Dental: regular dental visits     History:  LMP: due today  : 0  Para: 0    Cancer Screening  Colononoscopy N/A  Mammogram N/A   Pap N/A  Abnormal pap? not asked  Smoker N/A Annual screening with low-dose helical computed tomography (CT) for patients age 55 to 74 years with history of smoking at least 30 pack-years and, if a former smoker, had quit within the previous 15 years         The following portions of the patient's history were reviewed and updated as appropriate: allergies, current medications, past family history, past medical history, past social history, past surgical history and problem list.    Review of Systems     Review of Systems   Constitutional: Negative.  Negative for activity change, appetite change, chills, fatigue and fever.   HENT:  Positive for postnasal drip and rhinorrhea. Negative for congestion, ear pain and sinus pain.    Eyes: Negative.    Respiratory: Negative.  Negative for cough, chest tightness, shortness of breath and wheezing.    Cardiovascular: Negative.  Negative for chest pain, palpitations and leg swelling.   Gastrointestinal: Negative.  Negative for constipation and diarrhea.   Endocrine: Negative.    Genitourinary: Negative.  Negative for dysuria.   Musculoskeletal: Negative.    Skin: Negative.     Allergic/Immunologic: Negative.  Negative for immunocompromised state.   Neurological: Negative.  Negative for dizziness and light-headedness.   Hematological: Negative.    Psychiatric/Behavioral:  Positive for sleep disturbance. The patient is nervous/anxious.        Past Medical History     Past Medical History:   Diagnosis Date    ADHD (attention deficit hyperactivity disorder)     Allergic     Anemia     Anxiety     Asthma     Clotting disorder (HCC)     Migraines     Otitis media     Urinary tract infection        Past Surgical History     Past Surgical History:   Procedure Laterality Date    ADENOIDECTOMY      NASAL SEPTUM SURGERY      TONSILLECTOMY         Social History     Social History     Socioeconomic History    Marital status: Single     Spouse name: None    Number of children: None    Years of education: None    Highest education level: None   Occupational History    None   Tobacco Use    Smoking status: Never     Passive exposure: Yes    Smokeless tobacco: Never   Vaping Use    Vaping status: Never Used   Substance and Sexual Activity    Alcohol use: Never    Drug use: Never    Sexual activity: Yes     Partners: Male     Birth control/protection: Rhythm   Other Topics Concern    None   Social History Narrative    None     Social Determinants of Health     Financial Resource Strain: Not on file   Food Insecurity: Not on file   Transportation Needs: Not on file   Physical Activity: Not on file   Stress: Not on file   Social Connections: Unknown (6/18/2024)    Received from [facility]     How often do you feel lonely or isolated from those around you? (Adult - for ages 18 years and over): Not on file   Intimate Partner Violence: Not on file   Housing Stability: Not on file       Family History     Family History   Problem Relation Age of Onset    Migraines Mother     Seizures Mother     Mental illness Mother     Anxiety disorder Mother     Asthma Father     Stroke Father     ADD /  ADHD Father     Diabetes Maternal Grandfather     Pulmonary embolism Maternal Grandmother     Miscarriages / Stillbirths Maternal Grandmother     Cancer Maternal Grandmother     Asthma Paternal Grandmother     Migraines Paternal Grandmother     Autoimmune disease Paternal Grandmother        Current Medications       Current Outpatient Medications:     albuterol (ProAir HFA) 90 mcg/act inhaler, Inhale 2 puffs every 6 (six) hours as needed for wheezing or shortness of breath, Disp: 8.5 g, Rfl: 0    Albuterol Sulfate (albuterol, FOR EMS ONLY,) (2.5 mg/3 mL) 0.083 % nebulizer solution, Take 2.5 mg by nebulization every 6 (six) hours as needed for wheezing, Disp: , Rfl:     Cholecalciferol (VITAMIN D3) 1,000 units tablet, Take 2,000 Units by mouth daily, Disp: , Rfl:     Ferrous Sulfate ER (Slow Fe) 45 MG TBCR, Take 45 mg by mouth in the morning, Disp: , Rfl:     FLUoxetine 10 MG tablet, Take 1 tablet (10 mg total) by mouth daily, Disp: 90 tablet, Rfl: 3    hydrOXYzine HCL (ATARAX) 10 mg tablet, Take 1 tablet (10 mg total) by mouth every 6 (six) hours as needed for anxiety, Disp: 30 tablet, Rfl: 5    Riboflavin (B-2-400 PO), Take 400 mg by mouth in the morning, Disp: , Rfl:     rizatriptan (MAXALT) 10 mg tablet, Take 1 tablet at the onset of a migraine; may repeat once in 2 hours if needed. Max 3 days per week, Disp: 12 tablet, Rfl: 2    Spacer/Aero-Holding Chambers NIRU, Use in the morning, Disp: 1 each, Rfl: 0    Triamcinolone Acetonide (Nasacort Allergy 24HR) 55 MCG/ACT nasal spray, 1 spray by Each Nare route daily, Disp: 16.9 mL, Rfl: 5     Allergies     Allergies   Allergen Reactions    Ketorolac Hives     Other reaction(s): Edema-Airway  Given in an Outside ED - High dose benadryl given     Azithromycin      Other reaction(s): nausea and vomiting    Ketorocaine-L Tongue Swelling     Other reaction(s): tongue swelling    Ketorolac Tromethamine Throat Swelling and Tongue Swelling    Latex Hives and Blisters     Medical Tape Blisters     Other reaction(s): blisters    Montelukast Hives and Other (See Comments)    Nsaids Throat Swelling    Other Other (See Comments)     Adhesives, gets Blisters    Sulfamethoxazole-Trimethoprim GI Intolerance       Objective     /78   Pulse 71   Wt 80.3 kg (177 lb)   SpO2 97%   BMI 30.38 kg/m²   Wt Readings from Last 3 Encounters:   10/16/24 80.3 kg (177 lb)   09/26/24 79.4 kg (175 lb)   05/10/24 76.2 kg (168 lb)     BP Readings from Last 3 Encounters:   10/16/24 124/78   09/26/24 114/70   06/13/24 110/73     Pulse Readings from Last 3 Encounters:   10/16/24 71   09/26/24 99   06/13/24 84     Body mass index is 30.38 kg/m².     Physical Exam  Vitals and nursing note reviewed.   Constitutional:       Appearance: Normal appearance.   HENT:      Head: Normocephalic and atraumatic.      Right Ear: Tympanic membrane, ear canal and external ear normal.      Left Ear: Tympanic membrane, ear canal and external ear normal.      Nose: Rhinorrhea present. Rhinorrhea is clear.      Mouth/Throat:      Mouth: Mucous membranes are moist.      Pharynx: Oropharynx is clear. Postnasal drip present.   Eyes:      Extraocular Movements: Extraocular movements intact.      Conjunctiva/sclera: Conjunctivae normal.      Pupils: Pupils are equal, round, and reactive to light.   Cardiovascular:      Rate and Rhythm: Normal rate and regular rhythm.      Pulses: Normal pulses.      Heart sounds: Normal heart sounds.   Pulmonary:      Effort: Pulmonary effort is normal.      Breath sounds: Normal breath sounds.   Abdominal:      General: Bowel sounds are normal.      Palpations: Abdomen is soft.   Musculoskeletal:         General: Normal range of motion.      Cervical back: Normal range of motion and neck supple.   Skin:     General: Skin is warm and dry.   Neurological:      General: No focal deficit present.      Mental Status: She is alert and oriented to person, place, and time.   Psychiatric:         Mood  and Affect: Mood is anxious.         Speech: Speech normal.         Behavior: Behavior normal. Behavior is cooperative.         Thought Content: Thought content normal.         Cognition and Memory: Cognition and memory normal.         Judgment: Judgment normal.           No results found.    Health Maintenance     Health Maintenance   Topic Date Due    Hepatitis C Screening  Never done    Pneumococcal Vaccine: Pediatrics (0 to 5 Years) and At-Risk Patients (6 to 64 Years) (1 of 2 - PCV) Never done    DTaP,Tdap,and Td Vaccines (1 - Tdap) Never done    HIV Screening  Never done    HPV Vaccine (1 - 3-dose series) Never done    Chlamydia Screening  01/13/2021    Annual Physical  Never done    OT PLAN OF CARE  12/27/2023    COVID-19 Vaccine (3 - 2023-24 season) 09/01/2024    Depression Screening  10/16/2025    Zoster Vaccine (1 of 2) 04/10/2054    RSV Vaccine Age 60+ Years (1 - 1-dose 60+ series) 04/10/2064    Meningococcal ACWY Vaccine  Completed    Influenza Vaccine  Completed    RSV Vaccine age 0-20 Months  Aged Out    HIB Vaccine  Aged Out    IPV Vaccine  Aged Out    Hepatitis A Vaccine  Aged Out     Immunization History   Administered Date(s) Administered    COVID-19 PFIZER VACCINE 0.3 ML IM 05/07/2021, 05/28/2021    INFLUENZA 08/23/2016, 09/24/2018, 10/28/2019, 12/27/2021    Influenza, seasonal, injectable, preservative free 10/16/2024    Meningococcal MCV4, Unspecified 08/24/2021       Laboratory Results:   Lab Results   Component Value Date    WBC 11.72 (H) 10/09/2024    WBC 5.55 09/29/2023    WBC 10.47 (H) 05/22/2023    HGB 13.7 10/09/2024    HGB 12.7 09/29/2023    HGB 11.8 05/22/2023    HCT 43.6 10/09/2024    HCT 40.8 09/29/2023    HCT 37.5 05/22/2023    MCV 89 10/09/2024    MCV 85 09/29/2023    MCV 86 05/22/2023     10/09/2024     09/29/2023     05/22/2023     Lab Results   Component Value Date    BUN 13 10/09/2024    BUN 10 09/29/2023    BUN 13 05/22/2023     Lab Results   Component  "Value Date    GLUC 89 10/09/2024    GLUC 107 05/22/2023    GLUC 83 10/23/2021    ALT 15 10/09/2024    ALT 21 09/29/2023    ALT 7 05/22/2023    AST 16 10/09/2024    AST 19 09/29/2023    AST 13 05/22/2023     No results found for: \"TSH\"  No results found for: \"A1C\"    Lipid Profile:   No results found for: \"CHOL\"  No results found for: \"HDL\"  No results found for: \"LDLC\"  No results found for: \"LDLCALC\"  No results found for: \"TRIG\"      Assessment/Plan   1. Well adult exam  2. Anxiety  Assessment & Plan:  HX anxiety with OCD.  Has only been on fluoxetine for the past week due to prior-auth with insurance.  Reports tolerance at this time with mild nausea and less of an appetite. Sleep unchanged as she struggles to \"shut it off\" (4-5 hours night) has only used hydroxyzine once.  Trying to avoid caffeine.    -continue CBT  -continue fluoxetine with recheck in 4 weeks.  -use hydroxyzine for sleep  Orders:  -     hydrOXYzine HCL (ATARAX) 10 mg tablet; Take 1 tablet (10 mg total) by mouth every 6 (six) hours as needed for anxiety  3. Mixed obsessional thoughts and acts  4. Allergic rhinitis with postnasal drip  Assessment & Plan:  Recent URI in September with resolution however persistent rhinitis with PND.  Denies cough/congestion/fever/chills.  No paroxysmal dyspnea.    -lungs CTA.  +rhinorrhea with PND  -will trial nasacort as directed in office.      Orders:  -     Triamcinolone Acetonide (Nasacort Allergy 24HR) 55 MCG/ACT nasal spray; 1 spray by Each Nare route daily  5. Need for influenza vaccination  -     influenza vaccine preservative-free 0.5 mL IM (Fluzone, Afluria, Fluarix, Flulaval)      1. Healthy female exam.  2. Patient Counseling:   Nutrition: Stressed importance of a well balanced diet, moderation of sodium/saturated fat, caloric balance and sufficient intake of fiber  Exercise: Stressed the importance of regular exercise with a goal of 150 minutes per week  Dental Health: Discussed daily flossing and " brushing and regular dental visits   Sexuality: Discussed sexually transmitted infections, use of condoms and prevention of unintended pregnancy  Alcohol Use:  Recommended moderation of alcohol intake  Injury Prevention: Discussed Safety Belts, Safety Helmets, and Smoke Detectors    Immunizations reviewed.   Discussed benefits of screening .  Discussed the patient's BMI with her.  The BMI is above average; BMI management plan is completed  3. Cancer Screening   4. Labs  reviewed  5. Schedule eye exam. Nasacort for allergies.  Refilled hydroxyzine   6. Follow up in 4 weeks.    TORIBIO Mendieta

## 2024-10-18 DIAGNOSIS — J45.31 MILD PERSISTENT ASTHMA WITH ACUTE EXACERBATION: ICD-10-CM

## 2024-10-18 RX ORDER — ALBUTEROL SULFATE 90 UG/1
INHALANT RESPIRATORY (INHALATION)
Qty: 8.5 G | Refills: 5 | Status: SHIPPED | OUTPATIENT
Start: 2024-10-18

## 2024-12-17 ENCOUNTER — TELEMEDICINE (OUTPATIENT)
Dept: FAMILY MEDICINE CLINIC | Facility: HOSPITAL | Age: 19
End: 2024-12-17
Payer: COMMERCIAL

## 2024-12-17 VITALS — BODY MASS INDEX: 27.46 KG/M2 | WEIGHT: 160 LBS

## 2024-12-17 DIAGNOSIS — H00.011 HORDEOLUM EXTERNUM OF RIGHT UPPER EYELID: ICD-10-CM

## 2024-12-17 DIAGNOSIS — F41.9 ANXIETY: Primary | ICD-10-CM

## 2024-12-17 DIAGNOSIS — F42.2 MIXED OBSESSIONAL THOUGHTS AND ACTS: ICD-10-CM

## 2024-12-17 PROCEDURE — 99213 OFFICE O/P EST LOW 20 MIN: CPT | Performed by: NURSE PRACTITIONER

## 2024-12-17 RX ORDER — FLUOXETINE 20 MG/1
20 TABLET, FILM COATED ORAL DAILY
Qty: 90 TABLET | Refills: 3 | Status: SHIPPED | OUTPATIENT
Start: 2024-12-17

## 2024-12-17 NOTE — PROGRESS NOTES
Virtual Regular Visit  Name: Carola Gustafson      : 2005      MRN: [id number]  Encounter Provider: TORIBIO Mendieta  Encounter Date: 2024   Encounter department: [facility] [address]      Verification of patient location:  Patient is located at Home in the following state in which I hold an active license [place] :  Assessment & Plan  Anxiety  History of anxiety with OCD.  Continues with weekly psychotherapy and medication adherence to fluoxetine 10 mg p.o. daily.  Denies any side effects with medication.  Notes persistent symptoms of anxiety with OCD traits with current fluoxetine dose.  Sleep has improved taking hydroxyzine as needed.  -Agreeable to increasing fluoxetine to 20 mg p.o. daily.  Advised that she is not at goal of 40 mg p.o. daily with verbalized understanding.  Will continue with psychotherapy as the medication is a supplement to her treatment.  She will follow-up in 1 month.  Orders:    FLUoxetine 20 MG tablet; Take 1 tablet (20 mg total) by mouth daily    Mixed obsessional thoughts and acts         Hordeolum externum of right upper eyelid  Reports acute onset of red painful lump upper eyelid.  No fever chills change in appetite no changes in vision.  Recommended warm washcloth be applied to eyelid for 20 minutes up to 4 times daily.  Baby shampoo washes to eyelids at least 3-4 times weekly.  Monitor for signs symptoms of infection.         Anxiety         Mixed obsessional thoughts and acts               Encounter provider TORIBIO Mendieta    The patient was identified by name and date of birth. Carola Gustafson was informed that this is a telemedicine visit and that the visit is being conducted through the Epic Embedded platform. She agrees to proceed..  My office door was closed. No one else was in the room.  She acknowledged consent and understanding of privacy and security of the video platform. The patient has agreed to participate and understands they can  discontinue the visit at any time.    Patient is aware this is a billable service.     History of Present Illness     Here for follow-up.          Review of Systems   Constitutional: Negative.  Negative for activity change, appetite change, chills, fatigue and fever.   HENT: Negative.  Negative for congestion, ear pain, postnasal drip and sinus pain.    Eyes: Negative.  Negative for visual disturbance.   Respiratory: Negative.  Negative for cough and shortness of breath.    Cardiovascular: Negative.  Negative for chest pain and leg swelling.   Gastrointestinal: Negative.  Negative for constipation and diarrhea.   Endocrine: Negative.    Genitourinary: Negative.  Negative for dysuria.   Musculoskeletal: Negative.    Skin: Negative.    Allergic/Immunologic: Negative.  Negative for immunocompromised state.   Neurological: Negative.  Negative for dizziness and light-headedness.   Hematological: Negative.    Psychiatric/Behavioral:  Negative for sleep disturbance. The patient is nervous/anxious.        Objective   Wt 72.6 kg (160 lb)   LMP 12/11/2024   BMI 27.46 kg/m²     Physical Exam  Vitals and nursing note reviewed.   Constitutional:       General: She is not in acute distress.     Appearance: Normal appearance. She is well-developed.   HENT:      Head: Normocephalic and atraumatic.   Eyes:      Conjunctiva/sclera: Conjunctivae normal.      Comments: Right upper eyelid stye.   Cardiovascular:      Rate and Rhythm: Normal rate.      Heart sounds: No murmur heard.  Pulmonary:      Effort: Pulmonary effort is normal. No respiratory distress.      Breath sounds: Normal breath sounds.   Abdominal:      Palpations: Abdomen is soft.      Tenderness: There is no abdominal tenderness.   Musculoskeletal:         General: No swelling. Normal range of motion.      Cervical back: Neck supple.   Skin:     General: Skin is warm and dry.      Capillary Refill: Capillary refill takes less than 2 seconds.   Neurological:      Mental  Status: She is alert.   Psychiatric:         Attention and Perception: Attention and perception normal.         Mood and Affect: Mood is anxious.         Speech: Speech normal.         Behavior: Behavior normal. Behavior is cooperative.         Thought Content: Thought content normal.         Cognition and Memory: Cognition and memory normal.         Judgment: Judgment normal.         Visit Time  Total Visit Duration: 10 minutes

## 2024-12-17 NOTE — ASSESSMENT & PLAN NOTE
History of anxiety with OCD.  Continues with weekly psychotherapy and medication adherence to fluoxetine 10 mg p.o. daily.  Denies any side effects with medication.  Notes persistent symptoms of anxiety with OCD traits with current fluoxetine dose.  Sleep has improved taking hydroxyzine as needed.  -Agreeable to increasing fluoxetine to 20 mg p.o. daily.  Advised that she is not at goal of 40 mg p.o. daily with verbalized understanding.  Will continue with psychotherapy as the medication is a supplement to her treatment.  She will follow-up in 1 month.  Orders:    FLUoxetine 20 MG tablet; Take 1 tablet (20 mg total) by mouth daily

## 2024-12-17 NOTE — ASSESSMENT & PLAN NOTE
Reports acute onset of red painful lump upper eyelid.  No fever chills change in appetite no changes in vision.  Recommended warm washcloth be applied to eyelid for 20 minutes up to 4 times daily.  Baby shampoo washes to eyelids at least 3-4 times weekly.  Monitor for signs symptoms of infection.

## 2024-12-17 NOTE — PROGRESS NOTES
[facility]   Kayli ROONEY    Assessment/Plan:   {There are no diagnoses linked to this encounter. (Refresh or delete this SmartLink)}    No follow-ups on file.  Patient may call or return to office with any questions or concerns.     ______________________________________________________________________  Subjective:     Patient ID: Carola Gustafson is a 19 y.o. female.  Carola Gustafson  No chief complaint on file.    HPI           The following portions of the patient's history were reviewed and updated as appropriate: allergies, current medications, past family history, past medical history, past social history, past surgical history, and problem list.    Review of Systems   Constitutional: Negative.  Negative for activity change, appetite change, chills, fatigue and fever.   HENT: Negative.  Negative for congestion, ear pain, postnasal drip and sinus pain.    Eyes: Negative.    Respiratory: Negative.  Negative for cough and shortness of breath.    Cardiovascular: Negative.  Negative for chest pain and leg swelling.   Gastrointestinal: Negative.  Negative for constipation and diarrhea.   Endocrine: Negative.    Genitourinary: Negative.  Negative for dysuria.   Musculoskeletal: Negative.    Skin: Negative.    Allergic/Immunologic: Negative.  Negative for immunocompromised state.   Neurological: Negative.  Negative for dizziness and light-headedness.   Hematological: Negative.    Psychiatric/Behavioral: Negative.           Objective:    There were no vitals filed for this visit.   Physical Exam  Vitals and nursing note reviewed.   Constitutional:       Appearance: Normal appearance.   HENT:      Head: Normocephalic and atraumatic.      Right Ear: Tympanic membrane, ear canal and external ear normal.      Left Ear: Tympanic membrane, ear canal and external ear normal.      Nose: Nose normal.      Mouth/Throat:      Mouth: Mucous membranes are moist.      Pharynx: Oropharynx is clear.   Eyes:       "Extraocular Movements: Extraocular movements intact.      Conjunctiva/sclera: Conjunctivae normal.      Pupils: Pupils are equal, round, and reactive to light.   Cardiovascular:      Rate and Rhythm: Normal rate and regular rhythm.      Pulses: Normal pulses.      Heart sounds: Normal heart sounds.   Pulmonary:      Effort: Pulmonary effort is normal.      Breath sounds: Normal breath sounds.   Abdominal:      General: Bowel sounds are normal.      Palpations: Abdomen is soft.   Musculoskeletal:         General: Normal range of motion.      Cervical back: Normal range of motion and neck supple.   Skin:     General: Skin is warm and dry.   Neurological:      General: No focal deficit present.      Mental Status: She is alert and oriented to person, place, and time.   Psychiatric:         Mood and Affect: Mood normal.         Behavior: Behavior normal.         Thought Content: Thought content normal.         Judgment: Judgment normal.           Portions of the record may have been created with voice recognition software. Occasional wrong word or \"sound alike\" substitutions may have occurred due to the inherent limitations of voice recognition software. Please review the chart carefully and recognize, using context, where substitutions/typographical errors may have occurred.       "

## 2025-01-15 ENCOUNTER — APPOINTMENT (OUTPATIENT)
Dept: LAB | Facility: CLINIC | Age: 20
End: 2025-01-15

## 2025-01-15 ENCOUNTER — OCCMED (OUTPATIENT)
Dept: URGENT CARE | Facility: CLINIC | Age: 20
End: 2025-01-15

## 2025-01-15 DIAGNOSIS — Z02.1 PRE-EMPLOYMENT HEALTH SCREENING EXAMINATION: ICD-10-CM

## 2025-01-15 DIAGNOSIS — Z02.1 PRE-EMPLOYMENT HEALTH SCREENING EXAMINATION: Primary | ICD-10-CM

## 2025-01-15 LAB — RUBV IGG SERPL IA-ACNC: 80 IU/ML

## 2025-01-15 PROCEDURE — 86480 TB TEST CELL IMMUN MEASURE: CPT

## 2025-01-15 PROCEDURE — 86735 MUMPS ANTIBODY: CPT

## 2025-01-15 PROCEDURE — 86762 RUBELLA ANTIBODY: CPT

## 2025-01-15 PROCEDURE — 36415 COLL VENOUS BLD VENIPUNCTURE: CPT

## 2025-01-15 PROCEDURE — 86765 RUBEOLA ANTIBODY: CPT

## 2025-01-15 PROCEDURE — 86787 VARICELLA-ZOSTER ANTIBODY: CPT

## 2025-01-16 LAB
GAMMA INTERFERON BACKGROUND BLD IA-ACNC: 0.06 IU/ML
M TB IFN-G BLD-IMP: NEGATIVE
M TB IFN-G CD4+ BCKGRND COR BLD-ACNC: 0.01 IU/ML
M TB IFN-G CD4+ BCKGRND COR BLD-ACNC: 0.01 IU/ML
MEV IGG SER QL IA: NORMAL
MITOGEN IGNF BCKGRD COR BLD-ACNC: 9.94 IU/ML
MUV IGG SER QL IA: NORMAL
VZV IGG SER QL IA: NORMAL

## 2025-03-03 ENCOUNTER — OFFICE VISIT (OUTPATIENT)
Dept: URGENT CARE | Facility: CLINIC | Age: 20
End: 2025-03-03
Payer: COMMERCIAL

## 2025-03-03 VITALS
HEART RATE: 87 BPM | OXYGEN SATURATION: 99 % | DIASTOLIC BLOOD PRESSURE: 71 MMHG | TEMPERATURE: 98.1 F | SYSTOLIC BLOOD PRESSURE: 129 MMHG | RESPIRATION RATE: 18 BRPM

## 2025-03-03 DIAGNOSIS — J98.8 RESPIRATORY TRACT INFECTION: Primary | ICD-10-CM

## 2025-03-03 PROCEDURE — G0382 LEV 3 HOSP TYPE B ED VISIT: HCPCS

## 2025-03-03 PROCEDURE — S9083 URGENT CARE CENTER GLOBAL: HCPCS

## 2025-03-03 RX ORDER — METHYLPREDNISOLONE 4 MG/1
TABLET ORAL
Qty: 21 TABLET | Refills: 0 | Status: SHIPPED | OUTPATIENT
Start: 2025-03-03

## 2025-03-03 NOTE — PROGRESS NOTES
[facility]        NAME: Carola Gustafson is a 19 y.o. female  : 2005    MRN: [id number]  DATE: March 3, 2025  TIME: 1:00 PM    Assessment and Plan   Respiratory tract infection [J98.8]  1. Respiratory tract infection  amoxicillin-clavulanate (AUGMENTIN) 875-125 mg per tablet    methylPREDNISolone 4 MG tablet therapy pack            Patient Instructions     Take Augmentin and steroids as directed.  Continue Albuterol inhaler / nebulizer as previously prescribed.    For nasal/sinus congestion you can try steam, warm compresses, saline nasal spray, Neti pot, nasal steroid (Flonase, Nasocort), or nasal decongestant (Afrin - for 3 days only).    You can try a decongestant (Sudafed) if > 6 years of age and no history of high blood pressure.    For cough you can take an over-the-counter expectorant such as plain Robitussion or Mucinex. A spoonful of honey at bedtime may also be helpful.    For sore throat you can use Cepacol lozenges, do warm salt water gargles, drink warm water with lemon or herbal teas, or use an over-the-counter throat spray (Chloraseptic).    You can take ibuprofen/Motrin and acetaminophen/Tylenol as needed for pain, fever, body aches. Do not take ibuprofen/Motrin/Advil if you have a history of heart disease, bleeding ulcers, or if you take blood thinners.     Drink plenty of fluids to stay hydrated. Airborne or Emergen-C for extra vitamin C and zinc.    Follow up with your PCP in 3-5 days for persistent symptoms.    Go to the ER if symptoms worsen.     If tests are performed, our office will contact you with results only if changes need to made to the care plan discussed with you at the visit. You can review your full results on [facility].      Chief Complaint     Chief Complaint   Patient presents with    Cold Like Symptoms     Patient started Friday with cough, congestion, sinus pressure and ear pain.          History of Present Illness       19-year-old female who presents  for evaluation of cold-like symptoms x 3-4 days. Patient reports congestion, sinus pressure, ear discomfort, and a cough that is occasionally productive of mucous. Noticed some shortness of breath after coughing fits. Notes PMH asthma. Using Albuterol nebulizer every 8 hours which provides her with relief. States last 1-2 days congestion moved into chest. Felt dizzy and vomited once last night. Taking DayQuil. 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        Review of Systems   Review of Systems   Constitutional:  Negative for chills and fever.   HENT:  Positive for congestion, ear pain and sinus pressure. Negative for ear discharge and sore throat.    Eyes:  Negative for discharge and redness.   Respiratory:  Positive for cough and shortness of breath. Negative for wheezing.    Cardiovascular:  Negative for chest pain and palpitations.   Gastrointestinal:  Positive for vomiting (x1). Negative for abdominal pain and diarrhea.   Skin:  Negative for rash.   Neurological:  Positive for dizziness (resolved). Negative for light-headedness and headaches.         Current Medications       Current Outpatient Medications:     amoxicillin-clavulanate (AUGMENTIN) 875-125 mg per tablet, Take 1 tablet by mouth every 12 (twelve) hours for 7 days, Disp: 14 tablet, Rfl: 0    methylPREDNISolone 4 MG tablet therapy pack, Use as directed on package, Disp: 21 tablet, Rfl: 0    albuterol (PROVENTIL HFA,VENTOLIN HFA) 90 mcg/act inhaler, TAKE 2 PUFFS BY MOUTH EVERY 6 HOURS AS NEEDED FOR WHEEZE OR FOR SHORTNESS OF BREATH, Disp: 8.5 g, Rfl: 5    Albuterol Sulfate (albuterol, FOR EMS ONLY,) (2.5 mg/3 mL) 0.083 % nebulizer solution, Take 2.5 mg by nebulization every 6 (six) hours as needed for wheezing, Disp: , Rfl:     Cholecalciferol (VITAMIN D3) 1,000 units tablet, Take 2,000 Units by mouth daily, Disp: , Rfl:     Ferrous Sulfate ER (Slow Fe) 45 MG TBCR, Take 45 mg by mouth in the morning, Disp: , Rfl:     FLUoxetine 20 MG tablet, Take 1 tablet (20 mg  total) by mouth daily, Disp: 90 tablet, Rfl: 3    hydrOXYzine HCL (ATARAX) 10 mg tablet, Take 1 tablet (10 mg total) by mouth every 6 (six) hours as needed for anxiety, Disp: 30 tablet, Rfl: 5    Riboflavin (B-2-400 PO), Take 400 mg by mouth in the morning, Disp: , Rfl:     rizatriptan (MAXALT) 10 mg tablet, Take 1 tablet at the onset of a migraine; may repeat once in 2 hours if needed. Max 3 days per week, Disp: 12 tablet, Rfl: 2    Spacer/Aero-Holding Chambers NIRU, Use in the morning, Disp: 1 each, Rfl: 0    Triamcinolone Acetonide (Nasacort Allergy 24HR) 55 MCG/ACT nasal spray, 1 spray by Each Nare route daily, Disp: 16.9 mL, Rfl: 5    Current Allergies     Allergies as of 03/03/2025 - Reviewed 03/03/2025   Allergen Reaction Noted    Ketorolac Hives 01/19/2018    Azithromycin  05/25/2017    Ketorocaine-l Tongue Swelling 01/25/2018    Ketorolac tromethamine Throat Swelling and Tongue Swelling 02/15/2018    Latex Hives and Blisters 01/21/2016    Medical tape Blisters 12/10/2016    Montelukast Hives and Other (See Comments) 09/11/2008    Nsaids Throat Swelling 03/07/2019    Other Other (See Comments) 09/25/2014    Sulfamethoxazole-trimethoprim GI Intolerance 09/25/2014            The following portions of the patient's history were reviewed and updated as appropriate: allergies, current medications, past family history, past medical history, past social history, past surgical history and problem list.     Past Medical History:   Diagnosis Date    ADHD (attention deficit hyperactivity disorder)     Allergic     Anemia     Anxiety     Asthma     Clotting disorder (HCC)     Migraines     Otitis media     Urinary tract infection        Past Surgical History:   Procedure Laterality Date    ADENOIDECTOMY      NASAL SEPTUM SURGERY      TONSILLECTOMY         Family History   Problem Relation Age of Onset    Migraines Mother     Seizures Mother     Mental illness Mother     Anxiety disorder Mother     Asthma Father      Stroke Father     ADD / ADHD Father     Diabetes Maternal Grandfather     Pulmonary embolism Maternal Grandmother     Miscarriages / Stillbirths Maternal Grandmother     Cancer Maternal Grandmother     Asthma Paternal Grandmother     Migraines Paternal Grandmother     Autoimmune disease Paternal Grandmother          Medications have been verified.        Objective   /71   Pulse 87   Temp 98.1 °F (36.7 °C)   Resp 18   SpO2 99%        Physical Exam     Physical Exam  Vitals and nursing note reviewed.   Constitutional:       General: She is not in acute distress.     Appearance: She is not ill-appearing or diaphoretic.   HENT:      Head: Normocephalic and atraumatic.      Right Ear: Tympanic membrane, ear canal and external ear normal.      Left Ear: Tympanic membrane, ear canal and external ear normal.      Nose: Congestion present.      Mouth/Throat:      Mouth: Mucous membranes are moist.      Pharynx: Oropharynx is clear. Posterior oropharyngeal erythema present. No oropharyngeal exudate.   Eyes:      Conjunctiva/sclera: Conjunctivae normal.   Cardiovascular:      Rate and Rhythm: Normal rate and regular rhythm.      Pulses: Normal pulses.      Heart sounds: Normal heart sounds.   Pulmonary:      Effort: Pulmonary effort is normal.      Breath sounds: Normal breath sounds. No wheezing, rhonchi or rales.   Musculoskeletal:         General: Normal range of motion.      Cervical back: Normal range of motion and neck supple.   Lymphadenopathy:      Cervical: No cervical adenopathy.   Skin:     General: Skin is warm and dry.      Capillary Refill: Capillary refill takes less than 2 seconds.   Neurological:      Mental Status: She is alert and oriented to person, place, and time.

## 2025-03-03 NOTE — PATIENT INSTRUCTIONS
Take Augmentin and steroids as directed.  Continue Albuterol inhaler / nebulizer as previously prescribed.    For nasal/sinus congestion you can try steam, warm compresses, saline nasal spray, Neti pot, nasal steroid (Flonase, Nasocort), or nasal decongestant (Afrin - for 3 days only).    You can try a decongestant (Sudafed) if > 6 years of age and no history of high blood pressure.    For cough you can take an over-the-counter expectorant such as plain Robitussion or Mucinex. A spoonful of honey at bedtime may also be helpful.    For sore throat you can use Cepacol lozenges, do warm salt water gargles, drink warm water with lemon or herbal teas, or use an over-the-counter throat spray (Chloraseptic).    You can take ibuprofen/Motrin and acetaminophen/Tylenol as needed for pain, fever, body aches. Do not take ibuprofen/Motrin/Advil if you have a history of heart disease, bleeding ulcers, or if you take blood thinners.     Drink plenty of fluids to stay hydrated. Airborne or Emergen-C for extra vitamin C and zinc.    Follow up with your PCP in 3-5 days for persistent symptoms.    Go to the ER if symptoms worsen.

## 2025-03-19 ENCOUNTER — OFFICE VISIT (OUTPATIENT)
Dept: FAMILY MEDICINE CLINIC | Facility: HOSPITAL | Age: 20
End: 2025-03-19
Payer: COMMERCIAL

## 2025-03-19 VITALS
SYSTOLIC BLOOD PRESSURE: 102 MMHG | WEIGHT: 174 LBS | DIASTOLIC BLOOD PRESSURE: 74 MMHG | TEMPERATURE: 98.2 F | BODY MASS INDEX: 29.87 KG/M2 | OXYGEN SATURATION: 99 % | HEART RATE: 79 BPM

## 2025-03-19 DIAGNOSIS — H69.93 EUSTACHIAN TUBE DYSFUNCTION, BILATERAL: Primary | ICD-10-CM

## 2025-03-19 DIAGNOSIS — J30.9 ALLERGIC RHINITIS WITH POSTNASAL DRIP: ICD-10-CM

## 2025-03-19 DIAGNOSIS — R09.82 ALLERGIC RHINITIS WITH POSTNASAL DRIP: ICD-10-CM

## 2025-03-19 PROCEDURE — 99213 OFFICE O/P EST LOW 20 MIN: CPT | Performed by: NURSE PRACTITIONER

## 2025-03-19 NOTE — PROGRESS NOTES
[facility]   Kayli ROONEY    Assessment/Plan:   1. Eustachian tube dysfunction, bilateral  Assessment & Plan:  Was seen in urgent care on 3/3/2025 and put on Augmentin as well as Medrol Dosepak for presumed ABRS.  Reports all symptoms have resolved/improved except with persistent ear fullness and minor maxillary sinus pressure.  No fever chills change in appetite.  Staying well-hydrated.  Denies dyspnea nor cough.  Has been using her Nasacort.  -NAD, afebrile, VSS.  Bilateral ear canals with mild mid ear effusion.  Associated clear rhinorrhea + PND.  No cervical adenopathy.  -Discussed ear fullness for several months post URI is a expected finding.  Continue to optimize nutrition and hydration and use of Nasacort.  Would consider adding Zyrtec or Claritin.  2. Allergic rhinitis with postnasal drip      Continue use of Nasacort as discussed in office.  Add zyrtec or Claritin.    No follow-ups on file.  Patient may call or return to office with any questions or concerns.     ______________________________________________________________________  Subjective:     Patient ID: Carola Gustafson is a 19 y.o. female.  Carola Gustafson  Chief Complaint   Patient presents with    Earache     Bilateral ear ran nx 3 weeks. Treated at urgent care      Here for ear fullness post recent URI.               The following portions of the patient's history were reviewed and updated as appropriate: allergies, current medications, past family history, past medical history, past social history, past surgical history, and problem list.    Review of Systems   Constitutional: Negative.  Negative for activity change, appetite change, chills, fatigue and fever.   HENT:  Positive for hearing loss, postnasal drip, rhinorrhea and sinus pressure. Negative for congestion, dental problem, ear pain, sinus pain, tinnitus and trouble swallowing.    Eyes: Negative.    Respiratory: Negative.  Negative for cough, chest tightness, shortness  of breath and wheezing.    Cardiovascular: Negative.  Negative for chest pain and leg swelling.   Gastrointestinal: Negative.  Negative for constipation and diarrhea.   Endocrine: Negative.    Genitourinary: Negative.  Negative for dysuria.   Musculoskeletal: Negative.    Skin: Negative.    Allergic/Immunologic: Negative.  Negative for immunocompromised state.   Neurological: Negative.  Negative for dizziness and light-headedness.   Hematological: Negative.    Psychiatric/Behavioral: Negative.  Negative for sleep disturbance.          Objective:      Vitals:    03/19/25 0952   BP: 102/74   Pulse: 79   Temp: 98.2 °F (36.8 °C)   SpO2: 99%      Physical Exam  Vitals and nursing note reviewed.   Constitutional:       General: She is awake. She is not in acute distress.     Appearance: Normal appearance. She is not ill-appearing.   HENT:      Head: Normocephalic and atraumatic.      Right Ear: Ear canal and external ear normal. Decreased hearing noted. A middle ear effusion is present. Tympanic membrane has normal mobility.      Left Ear: Ear canal and external ear normal. Decreased hearing noted. A middle ear effusion is present. Tympanic membrane is not erythematous. Tympanic membrane has normal mobility.      Nose: Rhinorrhea present. Rhinorrhea is clear.      Right Sinus: Maxillary sinus tenderness present.      Left Sinus: Maxillary sinus tenderness present.      Comments: Mild maxillary tenderness with palpation.     Mouth/Throat:      Mouth: Mucous membranes are moist.      Pharynx: Oropharynx is clear. Postnasal drip present.   Eyes:      Extraocular Movements: Extraocular movements intact.      Conjunctiva/sclera: Conjunctivae normal.      Pupils: Pupils are equal, round, and reactive to light.   Cardiovascular:      Rate and Rhythm: Normal rate and regular rhythm.      Pulses: Normal pulses.      Heart sounds: Normal heart sounds.   Pulmonary:      Effort: Pulmonary effort is normal.      Breath sounds: Normal  "breath sounds.   Abdominal:      General: Bowel sounds are normal.      Palpations: Abdomen is soft.   Musculoskeletal:         General: Normal range of motion.      Cervical back: Normal range of motion and neck supple.   Skin:     General: Skin is warm and dry.   Neurological:      General: No focal deficit present.      Mental Status: She is alert and oriented to person, place, and time.   Psychiatric:         Mood and Affect: Mood normal.         Behavior: Behavior normal. Behavior is cooperative.         Thought Content: Thought content normal.         Judgment: Judgment normal.           Portions of the record may have been created with voice recognition software. Occasional wrong word or \"sound alike\" substitutions may have occurred due to the inherent limitations of voice recognition software. Please review the chart carefully and recognize, using context, where substitutions/typographical errors may have occurred.       "

## 2025-03-19 NOTE — ASSESSMENT & PLAN NOTE
Was seen in urgent care on 3/3/2025 and put on Augmentin as well as Medrol Dosepak for presumed ABRS.  Reports all symptoms have resolved/improved except with persistent ear fullness and minor maxillary sinus pressure.  No fever chills change in appetite.  Staying well-hydrated.  Denies dyspnea nor cough.  Has been using her Nasacort.  -NAD, afebrile, VSS.  Bilateral ear canals with mild mid ear effusion.  Associated clear rhinorrhea + PND.  No cervical adenopathy.  -Discussed ear fullness for several months post URI is a expected finding.  Continue to optimize nutrition and hydration and use of Nasacort.  Would consider adding Zyrtec or Claritin.

## 2025-04-06 ENCOUNTER — HOSPITAL ENCOUNTER (EMERGENCY)
Facility: HOSPITAL | Age: 20
Discharge: HOME | End: 2025-04-07
Attending: EMERGENCY MEDICINE
Payer: COMMERCIAL

## 2025-04-06 ENCOUNTER — APPOINTMENT (EMERGENCY)
Dept: RADIOLOGY | Facility: HOSPITAL | Age: 20
End: 2025-04-06
Payer: COMMERCIAL

## 2025-04-06 DIAGNOSIS — R42 DIZZINESS: ICD-10-CM

## 2025-04-06 DIAGNOSIS — R51.9 HEADACHE: ICD-10-CM

## 2025-04-06 DIAGNOSIS — R20.2 PARESTHESIAS: ICD-10-CM

## 2025-04-06 DIAGNOSIS — R11.0 NAUSEA: ICD-10-CM

## 2025-04-06 DIAGNOSIS — R07.89 ATYPICAL CHEST PAIN: Primary | ICD-10-CM

## 2025-04-06 LAB
BASOPHILS # BLD AUTO: 0.06 THOUSANDS/ÂΜL (ref 0–0.1)
BASOPHILS NFR BLD AUTO: 1 % (ref 0–1)
EOSINOPHIL # BLD AUTO: 0.08 THOUSAND/ÂΜL (ref 0–0.61)
EOSINOPHIL NFR BLD AUTO: 1 % (ref 0–6)
ERYTHROCYTE [DISTWIDTH] IN BLOOD BY AUTOMATED COUNT: 12 % (ref 11.6–15.1)
HCT VFR BLD AUTO: 45.1 % (ref 34.8–46.1)
HGB BLD-MCNC: 14.5 G/DL (ref 11.5–15.4)
IMM GRANULOCYTES # BLD AUTO: 0.04 THOUSAND/UL (ref 0–0.2)
IMM GRANULOCYTES NFR BLD AUTO: 0 % (ref 0–2)
LYMPHOCYTES # BLD AUTO: 4.15 THOUSANDS/ÂΜL (ref 0.6–4.47)
LYMPHOCYTES NFR BLD AUTO: 37 % (ref 14–44)
MCH RBC QN AUTO: 28.4 PG (ref 26.8–34.3)
MCHC RBC AUTO-ENTMCNC: 32.2 G/DL (ref 31.4–37.4)
MCV RBC AUTO: 88 FL (ref 82–98)
MONOCYTES # BLD AUTO: 0.73 THOUSAND/ÂΜL (ref 0.17–1.22)
MONOCYTES NFR BLD AUTO: 7 % (ref 4–12)
NEUTROPHILS # BLD AUTO: 6.16 THOUSANDS/ÂΜL (ref 1.85–7.62)
NEUTS SEG NFR BLD AUTO: 54 % (ref 43–75)
NRBC BLD AUTO-RTO: 0 /100 WBCS
PLATELET # BLD AUTO: 294 THOUSANDS/UL (ref 149–390)
PMV BLD AUTO: 9.7 FL (ref 8.9–12.7)
RBC # BLD AUTO: 5.11 MILLION/UL (ref 3.81–5.12)
WBC # BLD AUTO: 11.22 THOUSAND/UL (ref 4.31–10.16)

## 2025-04-06 PROCEDURE — 96361 HYDRATE IV INFUSION ADD-ON: CPT

## 2025-04-06 PROCEDURE — 96375 TX/PRO/DX INJ NEW DRUG ADDON: CPT

## 2025-04-06 PROCEDURE — 87811 SARS-COV-2 COVID19 W/OPTIC: CPT | Performed by: EMERGENCY MEDICINE

## 2025-04-06 PROCEDURE — 83735 ASSAY OF MAGNESIUM: CPT | Performed by: EMERGENCY MEDICINE

## 2025-04-06 PROCEDURE — 36415 COLL VENOUS BLD VENIPUNCTURE: CPT | Performed by: EMERGENCY MEDICINE

## 2025-04-06 PROCEDURE — 84484 ASSAY OF TROPONIN QUANT: CPT | Performed by: EMERGENCY MEDICINE

## 2025-04-06 PROCEDURE — 71045 X-RAY EXAM CHEST 1 VIEW: CPT

## 2025-04-06 PROCEDURE — 93005 ELECTROCARDIOGRAM TRACING: CPT

## 2025-04-06 PROCEDURE — 81025 URINE PREGNANCY TEST: CPT | Performed by: EMERGENCY MEDICINE

## 2025-04-06 PROCEDURE — 87804 INFLUENZA ASSAY W/OPTIC: CPT | Performed by: EMERGENCY MEDICINE

## 2025-04-06 PROCEDURE — 99285 EMERGENCY DEPT VISIT HI MDM: CPT | Performed by: EMERGENCY MEDICINE

## 2025-04-06 PROCEDURE — 80053 COMPREHEN METABOLIC PANEL: CPT | Performed by: EMERGENCY MEDICINE

## 2025-04-06 PROCEDURE — 85025 COMPLETE CBC W/AUTO DIFF WBC: CPT | Performed by: EMERGENCY MEDICINE

## 2025-04-06 PROCEDURE — 99285 EMERGENCY DEPT VISIT HI MDM: CPT

## 2025-04-06 PROCEDURE — 84443 ASSAY THYROID STIM HORMONE: CPT | Performed by: EMERGENCY MEDICINE

## 2025-04-06 RX ORDER — MAGNESIUM HYDROXIDE/ALUMINUM HYDROXICE/SIMETHICONE 120; 1200; 1200 MG/30ML; MG/30ML; MG/30ML
30 SUSPENSION ORAL ONCE
Status: COMPLETED | OUTPATIENT
Start: 2025-04-06 | End: 2025-04-06

## 2025-04-06 RX ORDER — MECLIZINE HCL 12.5 MG 12.5 MG/1
25 TABLET ORAL ONCE
Status: COMPLETED | OUTPATIENT
Start: 2025-04-06 | End: 2025-04-06

## 2025-04-06 RX ORDER — ONDANSETRON 2 MG/ML
4 INJECTION INTRAMUSCULAR; INTRAVENOUS ONCE
Status: COMPLETED | OUTPATIENT
Start: 2025-04-06 | End: 2025-04-06

## 2025-04-06 RX ORDER — FAMOTIDINE 10 MG/ML
20 INJECTION, SOLUTION INTRAVENOUS ONCE
Status: COMPLETED | OUTPATIENT
Start: 2025-04-06 | End: 2025-04-06

## 2025-04-06 RX ADMIN — MECLIZINE HYDROCHLORIDE 25 MG: 12.5 TABLET ORAL at 23:44

## 2025-04-06 RX ADMIN — FAMOTIDINE 20 MG: 10 INJECTION, SOLUTION INTRAVENOUS at 23:47

## 2025-04-06 RX ADMIN — ALUMINUM HYDROXIDE, MAGNESIUM HYDROXIDE, AND DIMETHICONE 30 ML: 200; 20; 200 SUSPENSION ORAL at 23:41

## 2025-04-06 RX ADMIN — ONDANSETRON 4 MG: 2 INJECTION, SOLUTION INTRAMUSCULAR; INTRAVENOUS at 23:42

## 2025-04-06 RX ADMIN — SODIUM CHLORIDE 1000 ML: 0.9 INJECTION, SOLUTION INTRAVENOUS at 23:55

## 2025-04-06 NOTE — Clinical Note
Carola Gustafson was seen and treated in our emergency department on 4/6/2025.                Diagnosis:     Carola  may return to work on return date.    She may return on this date: 04/08/2025         If you have any questions or concerns, please don't hesitate to call.      Adelia De Leon, DO    ______________________________           _______________          _______________  Hospital Representative                              Date                                Time

## 2025-04-07 ENCOUNTER — APPOINTMENT (EMERGENCY)
Dept: CT IMAGING | Facility: HOSPITAL | Age: 20
End: 2025-04-07
Payer: COMMERCIAL

## 2025-04-07 VITALS
BODY MASS INDEX: 29.02 KG/M2 | HEART RATE: 78 BPM | SYSTOLIC BLOOD PRESSURE: 99 MMHG | OXYGEN SATURATION: 96 % | RESPIRATION RATE: 20 BRPM | HEIGHT: 64 IN | TEMPERATURE: 97.8 F | WEIGHT: 170 LBS | DIASTOLIC BLOOD PRESSURE: 57 MMHG

## 2025-04-07 LAB
ALBUMIN SERPL BCG-MCNC: 4.8 G/DL (ref 3.5–5)
ALP SERPL-CCNC: 92 U/L (ref 34–104)
ALT SERPL W P-5'-P-CCNC: 13 U/L (ref 7–52)
AMPHETAMINES SERPL QL SCN: NEGATIVE
ANION GAP SERPL CALCULATED.3IONS-SCNC: 9 MMOL/L (ref 4–13)
AST SERPL W P-5'-P-CCNC: 16 U/L (ref 13–39)
ATRIAL RATE: 86 BPM
BARBITURATES UR QL: NEGATIVE
BENZODIAZ UR QL: NEGATIVE
BILIRUB SERPL-MCNC: 0.37 MG/DL (ref 0.2–1)
BUN SERPL-MCNC: 13 MG/DL (ref 5–25)
CALCIUM SERPL-MCNC: 9.8 MG/DL (ref 8.4–10.2)
CARDIAC TROPONIN I PNL SERPL HS: <2 NG/L (ref ?–50)
CHLORIDE SERPL-SCNC: 107 MMOL/L (ref 96–108)
CO2 SERPL-SCNC: 23 MMOL/L (ref 21–32)
COCAINE UR QL: NEGATIVE
CREAT SERPL-MCNC: 0.66 MG/DL (ref 0.6–1.3)
EXT PREGNANCY TEST URINE: NEGATIVE
EXT. CONTROL: NORMAL
FENTANYL UR QL SCN: NEGATIVE
FLUAV AG UPPER RESP QL IA.RAPID: NEGATIVE
FLUBV AG UPPER RESP QL IA.RAPID: NEGATIVE
GFR SERPL CREATININE-BSD FRML MDRD: 127 ML/MIN/1.73SQ M
GLUCOSE SERPL-MCNC: 89 MG/DL (ref 65–140)
HYDROCODONE UR QL SCN: NEGATIVE
MAGNESIUM SERPL-MCNC: 2 MG/DL (ref 1.9–2.7)
METHADONE UR QL: NEGATIVE
OPIATES UR QL SCN: NEGATIVE
OXYCODONE+OXYMORPHONE UR QL SCN: NEGATIVE
P AXIS: 80 DEGREES
PCP UR QL: NEGATIVE
POTASSIUM SERPL-SCNC: 3.7 MMOL/L (ref 3.5–5.3)
PR INTERVAL: 126 MS
PROT SERPL-MCNC: 8 G/DL (ref 6.4–8.4)
QRS AXIS: 89 DEGREES
QRSD INTERVAL: 88 MS
QT INTERVAL: 384 MS
QTC INTERVAL: 459 MS
SARS-COV+SARS-COV-2 AG RESP QL IA.RAPID: NEGATIVE
SODIUM SERPL-SCNC: 139 MMOL/L (ref 135–147)
T WAVE AXIS: 55 DEGREES
THC UR QL: NEGATIVE
TSH SERPL DL<=0.05 MIU/L-ACNC: 2.02 UIU/ML (ref 0.45–4.5)
VENTRICULAR RATE: 86 BPM

## 2025-04-07 PROCEDURE — 70498 CT ANGIOGRAPHY NECK: CPT

## 2025-04-07 PROCEDURE — 93010 ELECTROCARDIOGRAM REPORT: CPT | Performed by: INTERNAL MEDICINE

## 2025-04-07 PROCEDURE — 80307 DRUG TEST PRSMV CHEM ANLYZR: CPT | Performed by: EMERGENCY MEDICINE

## 2025-04-07 PROCEDURE — 96361 HYDRATE IV INFUSION ADD-ON: CPT

## 2025-04-07 PROCEDURE — 96365 THER/PROPH/DIAG IV INF INIT: CPT

## 2025-04-07 PROCEDURE — 96368 THER/DIAG CONCURRENT INF: CPT

## 2025-04-07 PROCEDURE — 70496 CT ANGIOGRAPHY HEAD: CPT

## 2025-04-07 PROCEDURE — 96375 TX/PRO/DX INJ NEW DRUG ADDON: CPT

## 2025-04-07 RX ORDER — MAGNESIUM SULFATE HEPTAHYDRATE 40 MG/ML
2 INJECTION, SOLUTION INTRAVENOUS ONCE
Status: COMPLETED | OUTPATIENT
Start: 2025-04-07 | End: 2025-04-07

## 2025-04-07 RX ORDER — LORAZEPAM 2 MG/ML
1 INJECTION INTRAMUSCULAR ONCE
Status: COMPLETED | OUTPATIENT
Start: 2025-04-07 | End: 2025-04-07

## 2025-04-07 RX ORDER — METOCLOPRAMIDE HYDROCHLORIDE 5 MG/ML
10 INJECTION INTRAMUSCULAR; INTRAVENOUS ONCE
Status: COMPLETED | OUTPATIENT
Start: 2025-04-07 | End: 2025-04-07

## 2025-04-07 RX ORDER — ACETAMINOPHEN 10 MG/ML
1000 INJECTION, SOLUTION INTRAVENOUS ONCE
Status: COMPLETED | OUTPATIENT
Start: 2025-04-07 | End: 2025-04-07

## 2025-04-07 RX ORDER — DIPHENHYDRAMINE HYDROCHLORIDE 50 MG/ML
25 INJECTION, SOLUTION INTRAMUSCULAR; INTRAVENOUS ONCE
Status: COMPLETED | OUTPATIENT
Start: 2025-04-07 | End: 2025-04-07

## 2025-04-07 RX ADMIN — ACETAMINOPHEN 1000 MG: 10 INJECTION INTRAVENOUS at 02:16

## 2025-04-07 RX ADMIN — DIPHENHYDRAMINE HYDROCHLORIDE 25 MG: 50 INJECTION, SOLUTION INTRAMUSCULAR; INTRAVENOUS at 02:09

## 2025-04-07 RX ADMIN — METOCLOPRAMIDE 10 MG: 5 INJECTION, SOLUTION INTRAMUSCULAR; INTRAVENOUS at 02:11

## 2025-04-07 RX ADMIN — LORAZEPAM 1 MG: 2 INJECTION INTRAMUSCULAR; INTRAVENOUS at 01:24

## 2025-04-07 RX ADMIN — MAGNESIUM SULFATE HEPTAHYDRATE 2 G: 40 INJECTION, SOLUTION INTRAVENOUS at 02:25

## 2025-04-07 RX ADMIN — IOHEXOL 100 ML: 350 INJECTION, SOLUTION INTRAVENOUS at 01:06

## 2025-04-07 NOTE — ED PROVIDER NOTES
Time reflects when diagnosis was documented in both MDM as applicable and the Disposition within this note       Time User Action Codes Description Comment    4/7/2025  1:12 AM Roby Sandoval [R07.89] Atypical chest pain     4/7/2025  1:12 AM Roby Sandoval [R20.2] Paresthesias     4/7/2025  1:12 AM Roby Sandoval Add [R11.0] Nausea     4/7/2025  2:06 AM Roby Sandoval Add [R51.9] Headache     4/7/2025  2:06 AM Roby Sandoval Add [R42] Dizziness           ED Disposition       ED Disposition   Discharge    Condition   Stable    Date/Time   Mon Apr 7, 2025  3:42 AM    Comment   Carola Gustafson discharge to home/self care.                   Assessment & Plan       Medical Decision Making  Amount and/or Complexity of Data Reviewed  Labs: ordered.  Radiology: ordered and independent interpretation performed.    Risk  OTC drugs.  Prescription drug management.        ED Course as of 04/07/25 0628   Sun Apr 06, 2025   2234 Procedure Note: EKG  Date/Time: 04/06/25 10:34 PM   Performed by: ROBY SANDOVAL  Authorized by: ROBY SANDOVAL  Indications / Diagnosis: CP  ECG reviewed by me, the ED Provider: yes   The EKG demonstrates:  Rhythm: normal sinus  Intervals: normal intervals  Axis: normal axis  QRS/Blocks:normal QRS  ST Changes: No acute ST Changes, no STD/NAZARIO.       2304 On chart review it appears patient was seen at the PCPs office 3/19/2025 for bilateral eustachian tube dysfunction with ongoing ear pain and fullness for the last few months following any URI currently does not appear to have any evidence of otitis externa, otitis media, completed course of steroids previously not complaining of ear pain at this point   Mon Apr 07, 2025   0116 Patient tearful, shaking after coming back from CT scan there was no acute respiratory distress lab work reviewed unremarkable including normal TSH, normal electrolytes, there is no other evidence of acute allergic reaction such as rash, trouble breathing, throat swelling will treat with Ativan for  possible anxiety   0156 Patient is less tremulous at this point, still somewhat tearful complaining of a frontal headache, nausea, will give migraine cocktail for now   0342 Resting calmly no acute complaints stable for discharge at this point no additional further inpatient valuation or treatment       Medications   meclizine (ANTIVERT) tablet 25 mg (25 mg Oral Given 4/6/25 2344)   ondansetron (ZOFRAN) injection 4 mg (4 mg Intravenous Given 4/6/25 2342)   sodium chloride 0.9 % bolus 1,000 mL (0 mL Intravenous Stopped 4/7/25 0325)   Famotidine (PF) (PEPCID) injection 20 mg (20 mg Intravenous Given 4/6/25 2347)   aluminum-magnesium hydroxide-simethicone (MAALOX) oral suspension 30 mL (30 mL Oral Given 4/6/25 2341)   iohexol (OMNIPAQUE) 350 MG/ML injection (MULTI-DOSE) 100 mL (100 mL Intravenous Given 4/7/25 0106)   LORazepam (ATIVAN) injection 1 mg (1 mg Intravenous Given 4/7/25 0124)   metoclopramide (REGLAN) injection 10 mg (10 mg Intravenous Given 4/7/25 0211)   diphenhydrAMINE (BENADRYL) injection 25 mg (25 mg Intravenous Given 4/7/25 0209)   magnesium sulfate 2 g/50 mL IVPB (premix) 2 g (0 g Intravenous Stopped 4/7/25 0325)   acetaminophen (Ofirmev) injection 1,000 mg (0 mg Intravenous Stopped 4/7/25 0325)       ED Risk Strat Scores      HEART Risk Score      Flowsheet Row Most Recent Value   Heart Score Risk Calculator    History 0 Filed at: 04/07/2025 0113   ECG 0 Filed at: 04/07/2025 0113   Age 0 Filed at: 04/07/2025 0113   Risk Factors 0 Filed at: 04/07/2025 0113   Troponin 0 Filed at: 04/07/2025 0113   HEART Score 0 Filed at: 04/07/2025 0113                                                  History of Present Illness       Chief Complaint   Patient presents with    Chest Pain     Pt states that around lunch time started with dizziness and eyes were shaking and then left sided chest pain. Pt states that the chest pain got worse and still feeling dizzy. Pt states that she feels like she is spinning        Past  Medical History:   Diagnosis Date    ADHD (attention deficit hyperactivity disorder)     Allergic     Anemia     Anxiety     Asthma     Clotting disorder (HCC)     Migraines     Otitis media     Urinary tract infection       Past Surgical History:   Procedure Laterality Date    ADENOIDECTOMY      NASAL SEPTUM SURGERY      TONSILLECTOMY        Family History   Problem Relation Age of Onset    Migraines Mother     Seizures Mother     Mental illness Mother     Anxiety disorder Mother     Asthma Father     Stroke Father     ADD / ADHD Father     Diabetes Maternal Grandfather     Pulmonary embolism Maternal Grandmother     Miscarriages / Stillbirths Maternal Grandmother     Cancer Maternal Grandmother     Asthma Paternal Grandmother     Migraines Paternal Grandmother     Autoimmune disease Paternal Grandmother       Social History     Tobacco Use    Smoking status: Never     Passive exposure: Yes    Smokeless tobacco: Never   Vaping Use    Vaping status: Never Used   Substance Use Topics    Alcohol use: Never    Drug use: Never      E-Cigarette/Vaping    E-Cigarette Use Never User       E-Cigarette/Vaping Substances    Nicotine No     THC No     CBD No     Flavoring No     Other No     Unknown No       I have reviewed and agree with the history as documented.     19-year-old female with multiple complaints, states that she started having some substernal upper abdominal chest pain that initially started after lunch, associated with some nausea and feeling like whenever she moves her eyes the eyes feel like they are twitching and lagging behind, possibly spinning sensation, also started having some paresthesias to the left hand and decree sensation to her left bicep.  No similar symptoms in the past however does have history of full body aches and pains and orthostatic lightheadedness which she was evaluated for and had some marker for an autoimmune disease previously but was never tested further, no family history of MS  no family history of early cardiac disease in first-degree relative or though states that her grandfather had some sort of a heart surgery in his 50s.  Denies any drug or alcohol use denies any THC use.  No recent trauma to the neck no chiropractic manipulation        Review of Systems   Constitutional:  Negative for appetite change and fever.   HENT:  Negative for rhinorrhea and sore throat.    Eyes:  Negative for photophobia and visual disturbance.   Respiratory:  Negative for cough, chest tightness and wheezing.    Cardiovascular:  Positive for chest pain. Negative for palpitations and leg swelling.   Gastrointestinal:  Negative for abdominal distention, abdominal pain, blood in stool, constipation and diarrhea.   Genitourinary:  Negative for dysuria, flank pain, frequency, hematuria and urgency.   Musculoskeletal:  Negative for back pain.   Skin:  Negative for rash.   Neurological:  Positive for dizziness, light-headedness and numbness. Negative for weakness and headaches.        Paresthesias   All other systems reviewed and are negative.          Objective       ED Triage Vitals   Temperature Pulse Blood Pressure Respirations SpO2 Patient Position - Orthostatic VS   04/06/25 2220 04/06/25 2220 04/06/25 2220 04/06/25 2220 04/06/25 2220 --   97.8 °F (36.6 °C) 93 121/72 18 100 %       Temp Source Heart Rate Source BP Location FiO2 (%) Pain Score    04/06/25 2220 04/07/25 0330 04/07/25 0330 -- 04/07/25 0326    Oral Monitor Left arm  No Pain      Vitals      Date and Time Temp Pulse SpO2 Resp BP Pain Score FACES Pain Rating User   04/07/25 0400 -- 78 96 % 20 99/57 -- -- AC   04/07/25 0330 -- 79 95 % 16 105/56 -- -- AC   04/07/25 0326 -- -- -- -- -- No Pain -- AC   04/06/25 2220 97.8 °F (36.6 °C) 93 100 % 18 121/72 -- -- LD            Physical Exam  Vitals and nursing note reviewed.   Constitutional:       Appearance: She is well-developed.   HENT:      Head: Normocephalic and atraumatic.   Eyes:      Pupils: Pupils  are equal, round, and reactive to light.   Neck:      Comments: No midline neck pain  Cardiovascular:      Rate and Rhythm: Normal rate and regular rhythm.      Heart sounds: No murmur heard.     No friction rub. No gallop.   Pulmonary:      Effort: Pulmonary effort is normal.      Breath sounds: No wheezing or rales.   Chest:      Chest wall: No tenderness.   Abdominal:      General: There is no distension.      Palpations: Abdomen is soft. There is no mass.      Tenderness: There is no guarding or rebound.   Musculoskeletal:      Cervical back: Normal range of motion and neck supple.   Skin:     General: Skin is warm and dry.      Capillary Refill: Capillary refill takes less than 2 seconds.   Neurological:      Mental Status: She is alert and oriented to person, place, and time.      Sensory: No sensory deficit.      Motor: No abnormal muscle tone.      Comments: Nonfocal neurologic exam including normal speech, normal gait, no dysmetria, extraocular movements intact without nystagmus, visual fields full by confrontation    Left arm with focal objective creased sensation over the left bicep otherwise well-perfused with intact radial pulse, intact sensation to the forearm and hand         Results Reviewed       Procedure Component Value Units Date/Time    Rapid drug screen, urine [[id number]]  (Normal) Collected: 04/07/25 0032    Lab Status: Final result Specimen: Urine, Clean Catch Updated: 04/07/25 0052     Amph/Meth UR Negative     Barbiturate Ur Negative     Benzodiazepine Urine Negative     Cocaine Urine Negative     Methadone Urine Negative     Opiate Urine Negative     PCP Ur Negative     THC Urine Negative     Oxycodone Urine Negative     Fentanyl Urine Negative     HYDROCODONE URINE Negative    Narrative:      FOR MEDICAL PURPOSES ONLY.   IF CONFIRMATION NEEDED PLEASE CONTACT THE LAB WITHIN 5 DAYS.    Drug Screen Cutoff Levels:  AMPHETAMINE/METHAMPHETAMINES  1000 ng/mL  BARBITURATES     200  ng/mL  BENZODIAZEPINES     200 ng/mL  COCAINE      300 ng/mL  METHADONE      300 ng/mL  OPIATES      300 ng/mL  PHENCYCLIDINE     25 ng/mL  THC       50 ng/mL  OXYCODONE      100 ng/mL  FENTANYL      5 ng/mL  HYDROCODONE     300 ng/mL    TSH, 3rd generation with Free T4 reflex [[id number]]  (Normal) Collected: 04/06/25 2339    Lab Status: Final result Specimen: Blood from Arm, Right Updated: 04/07/25 0033     TSH 3RD GENERATON 2.021 uIU/mL     POCT pregnancy, urine [[id number]]  (Normal) Collected: 04/07/25 0030    Lab Status: Final result Updated: 04/07/25 0033     EXT Preg Test, Ur Negative     Control Valid    HS Troponin 0hr (reflex protocol) [[id number]]  (Normal) Collected: 04/06/25 2339    Lab Status: Final result Specimen: Blood from Arm, Right Updated: 04/07/25 0024     hs TnI 0hr <2 ng/L     Comprehensive metabolic panel [[id number]] Collected: 04/06/25 2339    Lab Status: Final result Specimen: Blood from Arm, Right Updated: 04/07/25 0016     Sodium 139 mmol/L      Potassium 3.7 mmol/L      Chloride 107 mmol/L      CO2 23 mmol/L      ANION GAP 9 mmol/L      BUN 13 mg/dL      Creatinine 0.66 mg/dL      Glucose 89 mg/dL      Calcium 9.8 mg/dL      AST 16 U/L      ALT 13 U/L      Alkaline Phosphatase 92 U/L      Total Protein 8.0 g/dL      Albumin 4.8 g/dL      Total Bilirubin 0.37 mg/dL      eGFR 127 ml/min/1.73sq m     Narrative:      National Kid[employer] guidelines for Chronic Kidney Disease (CKD):     Stage 1 with normal or high GFR (GFR > 90 mL/min/1.73 square meters)    Stage 2 Mild CKD (GFR = 60-89 mL/min/1.73 square meters)    Stage 3A Moderate CKD (GFR = 45-59 mL/min/1.73 square meters)    Stage 3B Moderate CKD (GFR = 30-44 mL/min/1.73 square meters)    Stage 4 Severe CKD (GFR = 15-29 mL/min/1.73 square meters)    Stage 5 End Stage CKD (GFR <15 mL/min/1.73 square meters)  Note: GFR calculation is accurate only with a steady state creatinine    Magnesium [[id number]]  (Normal) Collected:  04/06/25 2339    Lab Status: Final result Specimen: Blood from Arm, Right Updated: 04/07/25 0016     Magnesium 2.0 mg/dL     FLU/COVID Rapid Antigen (30 min. TAT) - Preferred screening test in ED [[id number]]  (Normal) Collected: 04/06/25 2339    Lab Status: Final result Specimen: Nares from Nose Updated: 04/07/25 0016     SARS COV Rapid Antigen Negative     Influenza A Rapid Antigen Negative     Influenza B Rapid Antigen Negative    Narrative:      This test has been performed using the [employer] Michaela 2 FLU+SARS Antigen test under the Emergency Use Authorization (EUA). This test has been validated by the  and verified by the performing laboratory. The Michaela uses lateral flow immunofluorescent sandwich assay to detect SARS-COV, Influenza A and Influenza B Antigen.     The [employer] Michaela 2 SARS Antigen test does not differentiate between SARS-CoV and SARS-CoV-2.     Negative results are presumptive and may be confirmed with a molecular assay, if necessary, for patient management. Negative results do not rule out SARS-CoV-2 or influenza infection and should not be used as the sole basis for treatment or patient management decisions. A negative test result may occur if the level of antigen in a sample is below the limit of detection of this test.     Positive results are indicative of the presence of viral antigens, but do not rule out bacterial infection or co-infection with other viruses.     All test results should be used as an adjunct to clinical observations and other information available to the provider.    FOR PEDIATRIC PATIENTS - copy/paste COVID Guidelines URL to browser: [email or web address]    CBC and differential [[id number]]  (Abnormal) Collected: 04/06/25 2339    Lab Status: Final result Specimen: Blood from Arm, Right Updated: 04/06/25 2359     WBC 11.22 Thousand/uL      RBC 5.11 Million/uL      Hemoglobin 14.5 g/dL      Hematocrit 45.1 %       MCV 88 fL      MCH 28.4 pg      MCHC 32.2 g/dL      RDW 12.0 %      MPV 9.7 fL      Platelets 294 Thousands/uL      nRBC 0 /100 WBCs      Segmented % 54 %      Immature Grans % 0 %      Lymphocytes % 37 %      Monocytes % 7 %      Eosinophils Relative 1 %      Basophils Relative 1 %      Absolute Neutrophils 6.16 Thousands/µL      Absolute Immature Grans 0.04 Thousand/uL      Absolute Lymphocytes 4.15 Thousands/µL      Absolute Monocytes 0.73 Thousand/µL      Eosinophils Absolute 0.08 Thousand/µL      Basophils Absolute 0.06 Thousands/µL             CTA head and neck with and without contrast   Final Interpretation by Tiburcio Richard DO ([id number])      CT Brain:  No acute intracranial abnormality.      CT Angiography:  Unremarkable CTA neck and brain.                  Workstation performed: [id number]         XR chest portable   ED Interpretation by Adelia De Leon DO ([id number])   This study was ordered and independently reviewed by me    No acute findings noted             Procedures    ED Medication and Procedure Management   Prior to Admission Medications   Prescriptions Last Dose Informant Patient Reported? Taking?   Albuterol Sulfate (albuterol, FOR EMS ONLY,) (2.5 mg/3 mL) 0.083 % nebulizer solution   Yes No   Sig: Take 2.5 mg by nebulization every 6 (six) hours as needed for wheezing   Cholecalciferol (VITAMIN D3) 1,000 units tablet  Self Yes No   Sig: Take 2,000 Units by mouth daily   FLUoxetine 20 MG tablet   No No   Sig: Take 1 tablet (20 mg total) by mouth daily   Ferrous Sulfate ER (Slow Fe) 45 MG TBCR  Self Yes No   Sig: Take 45 mg by mouth in the morning   Riboflavin (B-2-400 PO)  Self Yes No   Sig: Take 400 mg by mouth in the morning   Spacer/Aero-Holding Chambers NIRU   No No   Sig: Use in the morning   Triamcinolone Acetonide (Nasacort Allergy 24HR) 55 MCG/ACT nasal spray   No No   Si spray by Each Nare route daily   albuterol (PROVENTIL HFA,VENTOLIN HFA) 90 mcg/act inhaler   No No   Sig: TAKE 2  PUFFS BY MOUTH EVERY 6 HOURS AS NEEDED FOR WHEEZE OR FOR SHORTNESS OF BREATH   hydrOXYzine HCL (ATARAX) 10 mg tablet   No No   Sig: Take 1 tablet (10 mg total) by mouth every 6 (six) hours as needed for anxiety   rizatriptan (MAXALT) 10 mg tablet   No No   Sig: Take 1 tablet at the onset of a migraine; may repeat once in 2 hours if needed. Max 3 days per week      Facility-Administered Medications: None     Discharge Medication List as of 4/7/2025  3:43 AM        CONTINUE these medications which have NOT CHANGED    Details   albuterol (PROVENTIL HFA,VENTOLIN HFA) 90 mcg/act inhaler TAKE 2 PUFFS BY MOUTH EVERY 6 HOURS AS NEEDED FOR WHEEZE OR FOR SHORTNESS OF BREATH, Normal      Albuterol Sulfate (albuterol, FOR EMS ONLY,) (2.5 mg/3 mL) 0.083 % nebulizer solution Take 2.5 mg by nebulization every 6 (six) hours as needed for wheezing, Historical Med      Cholecalciferol (VITAMIN D3) 1,000 units tablet Take 2,000 Units by mouth daily, Historical Med      Ferrous Sulfate ER (Slow Fe) 45 MG TBCR Take 45 mg by mouth in the morning, Historical Med      FLUoxetine 20 MG tablet Take 1 tablet (20 mg total) by mouth daily, Starting Tue 12/17/2024, Normal      hydrOXYzine HCL (ATARAX) 10 mg tablet Take 1 tablet (10 mg total) by mouth every 6 (six) hours as needed for anxiety, Starting Wed 10/16/2024, Normal      Riboflavin (B-2-400 PO) Take 400 mg by mouth in the morning, Historical Med      rizatriptan (MAXALT) 10 mg tablet Take 1 tablet at the onset of a migraine; may repeat once in 2 hours if needed. Max 3 days per week, Normal      Spacer/Aero-Holding Chambers NIRU Use in the morning, Starting Thu 9/26/2024, Normal      Triamcinolone Acetonide (Nasacort Allergy 24HR) 55 MCG/ACT nasal spray 1 spray by Each Nare route daily, Starting Wed 10/16/2024, Normal             ED SEPSIS DOCUMENTATION   Time reflects when diagnosis was documented in both MDM as applicable and the Disposition within this note       Time User Action Codes  Description Comment    4/7/2025  1:12 AM Adelia De Leon [R07.89] Atypical chest pain     4/7/2025  1:12 AM Adelia De Leon [R20.2] Paresthesias     4/7/2025  1:12 AM Adelia De Leon [R11.0] Nausea     4/7/2025  2:06 AM Adelia De Leon [R51.9] Headache     4/7/2025  2:06 AM Adelia De Leon [R42] Dizziness                  Adelia De Leon, DO  04/07/25 0628

## 2025-04-09 ENCOUNTER — NURSE TRIAGE (OUTPATIENT)
Age: 20
End: 2025-04-09

## 2025-04-09 ENCOUNTER — HOSPITAL ENCOUNTER (OUTPATIENT)
Facility: HOSPITAL | Age: 20
Setting detail: OBSERVATION
Discharge: HOME | End: 2025-04-10
Attending: EMERGENCY MEDICINE | Admitting: HOSPITALIST
Payer: COMMERCIAL

## 2025-04-09 ENCOUNTER — APPOINTMENT (EMERGENCY)
Dept: RADIOLOGY | Facility: HOSPITAL | Age: 20
End: 2025-04-09
Payer: COMMERCIAL

## 2025-04-09 ENCOUNTER — APPOINTMENT (OUTPATIENT)
Dept: MRI IMAGING | Facility: HOSPITAL | Age: 20
End: 2025-04-09
Payer: COMMERCIAL

## 2025-04-09 DIAGNOSIS — R20.0 NUMBNESS: ICD-10-CM

## 2025-04-09 DIAGNOSIS — R51.9 HEADACHE: Primary | ICD-10-CM

## 2025-04-09 DIAGNOSIS — R42 DIZZINESS: ICD-10-CM

## 2025-04-09 DIAGNOSIS — H53.8 BLURRED VISION: ICD-10-CM

## 2025-04-09 PROBLEM — G43.009 ATYPICAL MIGRAINE: Status: ACTIVE | Noted: 2025-04-09

## 2025-04-09 PROBLEM — Z86.69 HISTORY OF MIGRAINE HEADACHES: Status: ACTIVE | Noted: 2025-04-09

## 2025-04-09 LAB
ALBUMIN SERPL BCG-MCNC: 4.7 G/DL (ref 3.5–5)
ALP SERPL-CCNC: 80 U/L (ref 34–104)
ALT SERPL W P-5'-P-CCNC: 9 U/L (ref 7–52)
ANION GAP SERPL CALCULATED.3IONS-SCNC: 8 MMOL/L (ref 4–13)
AST SERPL W P-5'-P-CCNC: 14 U/L (ref 13–39)
BASOPHILS # BLD AUTO: 0.04 THOUSANDS/ÂΜL (ref 0–0.1)
BASOPHILS NFR BLD AUTO: 0 % (ref 0–1)
BILIRUB SERPL-MCNC: 0.35 MG/DL (ref 0.2–1)
BUN SERPL-MCNC: 16 MG/DL (ref 5–25)
CALCIUM SERPL-MCNC: 9.6 MG/DL (ref 8.4–10.2)
CARDIAC TROPONIN I PNL SERPL HS: <2 NG/L (ref ?–50)
CHLORIDE SERPL-SCNC: 104 MMOL/L (ref 96–108)
CO2 SERPL-SCNC: 24 MMOL/L (ref 21–32)
CREAT SERPL-MCNC: 0.64 MG/DL (ref 0.6–1.3)
EOSINOPHIL # BLD AUTO: 0.02 THOUSAND/ÂΜL (ref 0–0.61)
EOSINOPHIL NFR BLD AUTO: 0 % (ref 0–6)
ERYTHROCYTE [DISTWIDTH] IN BLOOD BY AUTOMATED COUNT: 11.9 % (ref 11.6–15.1)
EXT PREGNANCY TEST URINE: NEGATIVE
EXT. CONTROL: NORMAL
GFR SERPL CREATININE-BSD FRML MDRD: 128 ML/MIN/1.73SQ M
GLUCOSE SERPL-MCNC: 96 MG/DL (ref 65–140)
HCT VFR BLD AUTO: 45.6 % (ref 34.8–46.1)
HGB BLD-MCNC: 14.6 G/DL (ref 11.5–15.4)
IMM GRANULOCYTES # BLD AUTO: 0.03 THOUSAND/UL (ref 0–0.2)
IMM GRANULOCYTES NFR BLD AUTO: 0 % (ref 0–2)
LYMPHOCYTES # BLD AUTO: 2.22 THOUSANDS/ÂΜL (ref 0.6–4.47)
LYMPHOCYTES NFR BLD AUTO: 25 % (ref 14–44)
MAGNESIUM SERPL-MCNC: 1.9 MG/DL (ref 1.9–2.7)
MCH RBC QN AUTO: 28.2 PG (ref 26.8–34.3)
MCHC RBC AUTO-ENTMCNC: 32 G/DL (ref 31.4–37.4)
MCV RBC AUTO: 88 FL (ref 82–98)
MONOCYTES # BLD AUTO: 0.37 THOUSAND/ÂΜL (ref 0.17–1.22)
MONOCYTES NFR BLD AUTO: 4 % (ref 4–12)
NEUTROPHILS # BLD AUTO: 6.39 THOUSANDS/ÂΜL (ref 1.85–7.62)
NEUTS SEG NFR BLD AUTO: 71 % (ref 43–75)
NRBC BLD AUTO-RTO: 0 /100 WBCS
PLATELET # BLD AUTO: 257 THOUSANDS/UL (ref 149–390)
PMV BLD AUTO: 9.6 FL (ref 8.9–12.7)
POTASSIUM SERPL-SCNC: 3.8 MMOL/L (ref 3.5–5.3)
PROT SERPL-MCNC: 7.8 G/DL (ref 6.4–8.4)
RBC # BLD AUTO: 5.18 MILLION/UL (ref 3.81–5.12)
SODIUM SERPL-SCNC: 136 MMOL/L (ref 135–147)
WBC # BLD AUTO: 9.07 THOUSAND/UL (ref 4.31–10.16)

## 2025-04-09 PROCEDURE — 83735 ASSAY OF MAGNESIUM: CPT

## 2025-04-09 PROCEDURE — 84484 ASSAY OF TROPONIN QUANT: CPT

## 2025-04-09 PROCEDURE — 99284 EMERGENCY DEPT VISIT MOD MDM: CPT

## 2025-04-09 PROCEDURE — 80053 COMPREHEN METABOLIC PANEL: CPT

## 2025-04-09 PROCEDURE — 85025 COMPLETE CBC W/AUTO DIFF WBC: CPT

## 2025-04-09 PROCEDURE — 93005 ELECTROCARDIOGRAM TRACING: CPT

## 2025-04-09 PROCEDURE — 96375 TX/PRO/DX INJ NEW DRUG ADDON: CPT

## 2025-04-09 PROCEDURE — 36415 COLL VENOUS BLD VENIPUNCTURE: CPT

## 2025-04-09 PROCEDURE — 81025 URINE PREGNANCY TEST: CPT

## 2025-04-09 PROCEDURE — 71045 X-RAY EXAM CHEST 1 VIEW: CPT

## 2025-04-09 PROCEDURE — 70551 MRI BRAIN STEM W/O DYE: CPT

## 2025-04-09 PROCEDURE — 99223 1ST HOSP IP/OBS HIGH 75: CPT | Performed by: PHYSICIAN ASSISTANT

## 2025-04-09 PROCEDURE — 96365 THER/PROPH/DIAG IV INF INIT: CPT

## 2025-04-09 RX ORDER — METOCLOPRAMIDE HYDROCHLORIDE 5 MG/ML
10 INJECTION INTRAMUSCULAR; INTRAVENOUS EVERY 8 HOURS SCHEDULED
Status: DISCONTINUED | OUTPATIENT
Start: 2025-04-09 | End: 2025-04-10 | Stop reason: HOSPADM

## 2025-04-09 RX ORDER — HYDROXYZINE HYDROCHLORIDE 10 MG/1
10 TABLET, FILM COATED ORAL EVERY 6 HOURS PRN
Status: DISCONTINUED | OUTPATIENT
Start: 2025-04-09 | End: 2025-04-10 | Stop reason: HOSPADM

## 2025-04-09 RX ORDER — ACETAMINOPHEN 325 MG/1
650 TABLET ORAL ONCE
Status: COMPLETED | OUTPATIENT
Start: 2025-04-09 | End: 2025-04-09

## 2025-04-09 RX ORDER — ACETAMINOPHEN 325 MG/1
650 TABLET ORAL EVERY 6 HOURS PRN
Status: DISCONTINUED | OUTPATIENT
Start: 2025-04-09 | End: 2025-04-10 | Stop reason: HOSPADM

## 2025-04-09 RX ORDER — MAGNESIUM SULFATE HEPTAHYDRATE 40 MG/ML
2 INJECTION, SOLUTION INTRAVENOUS ONCE
Status: COMPLETED | OUTPATIENT
Start: 2025-04-09 | End: 2025-04-09

## 2025-04-09 RX ORDER — ONDANSETRON 2 MG/ML
4 INJECTION INTRAMUSCULAR; INTRAVENOUS EVERY 6 HOURS PRN
Status: DISCONTINUED | OUTPATIENT
Start: 2025-04-09 | End: 2025-04-10 | Stop reason: HOSPADM

## 2025-04-09 RX ORDER — MECLIZINE HCL 12.5 MG 12.5 MG/1
25 TABLET ORAL ONCE
Status: COMPLETED | OUTPATIENT
Start: 2025-04-09 | End: 2025-04-09

## 2025-04-09 RX ORDER — DEXAMETHASONE SODIUM PHOSPHATE 10 MG/ML
10 INJECTION, SOLUTION INTRAMUSCULAR; INTRAVENOUS ONCE
Status: COMPLETED | OUTPATIENT
Start: 2025-04-09 | End: 2025-04-09

## 2025-04-09 RX ORDER — METOCLOPRAMIDE HYDROCHLORIDE 5 MG/ML
10 INJECTION INTRAMUSCULAR; INTRAVENOUS ONCE
Status: COMPLETED | OUTPATIENT
Start: 2025-04-09 | End: 2025-04-09

## 2025-04-09 RX ORDER — DIPHENHYDRAMINE HYDROCHLORIDE 50 MG/ML
25 INJECTION, SOLUTION INTRAMUSCULAR; INTRAVENOUS EVERY 8 HOURS SCHEDULED
Status: DISCONTINUED | OUTPATIENT
Start: 2025-04-09 | End: 2025-04-10 | Stop reason: HOSPADM

## 2025-04-09 RX ORDER — SODIUM CHLORIDE 9 MG/ML
100 INJECTION, SOLUTION INTRAVENOUS ONCE
Status: COMPLETED | OUTPATIENT
Start: 2025-04-09 | End: 2025-04-09

## 2025-04-09 RX ADMIN — DEXAMETHASONE SODIUM PHOSPHATE 10 MG: 10 INJECTION, SOLUTION INTRAMUSCULAR; INTRAVENOUS at 21:30

## 2025-04-09 RX ADMIN — SODIUM CHLORIDE 1000 ML: 0.9 INJECTION, SOLUTION INTRAVENOUS at 15:59

## 2025-04-09 RX ADMIN — METOCLOPRAMIDE 10 MG: 5 INJECTION, SOLUTION INTRAMUSCULAR; INTRAVENOUS at 15:57

## 2025-04-09 RX ADMIN — MECLIZINE HYDROCHLORIDE 25 MG: 12.5 TABLET ORAL at 15:57

## 2025-04-09 RX ADMIN — SODIUM CHLORIDE 100 ML/HR: 0.9 INJECTION, SOLUTION INTRAVENOUS at 19:29

## 2025-04-09 RX ADMIN — RIMEGEPANT SULFATE 75 MG: 75 TABLET, ORALLY DISINTEGRATING ORAL at 16:40

## 2025-04-09 RX ADMIN — ACETAMINOPHEN 650 MG: 325 TABLET, FILM COATED ORAL at 15:57

## 2025-04-09 RX ADMIN — HYDROXYZINE HYDROCHLORIDE 10 MG: 10 TABLET ORAL at 21:28

## 2025-04-09 RX ADMIN — DIPHENHYDRAMINE HYDROCHLORIDE 25 MG: 50 INJECTION, SOLUTION INTRAMUSCULAR; INTRAVENOUS at 21:31

## 2025-04-09 RX ADMIN — MAGNESIUM SULFATE HEPTAHYDRATE 2 G: 40 INJECTION, SOLUTION INTRAVENOUS at 15:56

## 2025-04-09 RX ADMIN — METOCLOPRAMIDE 10 MG: 5 INJECTION, SOLUTION INTRAMUSCULAR; INTRAVENOUS at 21:29

## 2025-04-09 NOTE — ASSESSMENT & PLAN NOTE
Approximately 4-day history of atypical migraine symptoms with headache, pressure behind left eye with blurred vision behind left eye, left hand numbness and tingling, chest pain.  Discussed with on-call neurology and admitted for MRI brain  Start migraine cocktail with dexamethasone.  Admit for observation

## 2025-04-09 NOTE — ED PROVIDER NOTES
"Time reflects when diagnosis was documented in both MDM as applicable and the Disposition within this note       Time User Action Codes Description Comment    4/9/2025  5:23 PM Ahmet Price Add [R51.9] Headache     4/9/2025  5:24 PM Tiffany Dinh Add [R42] Dizziness     4/9/2025  5:24 PM Tiffany Dinh Add [H53.8] Blurred vision     4/9/2025  5:24 PM Tiffany Dinh Add [R20.0] Numbness           ED Disposition       ED Disposition   Admit    Condition   Stable    Date/Time   Wed Apr 9, 2025  5:23 PM    Comment   Case was discussed with PA  and the patient's admission status was agreed to be Admission Status: observation status to the service of Dr. Ruiz .               Assessment & Plan       Medical Decision Making  DDx: electrolyte abnormality, dysrhythmia, anemia, vertigo   Patient was seen at this facility on Sunday evening received a CTA, full, full compliment of laboratory work, was discharged without abnormalities found.  Considered CVA given prolonged dizziness although had negative CTA on Sunday. Plan to repeat lab work, check EKG and chest x-ray for repeat of chest pressure.  Patient has no shortness of breath, no diaphoresis.  Given continued neurologic symptoms will reach out to with neurology for recommendations.  Spoke with Dr. Crespo recommending Nurtec 75 mg, migraine cocktail. If no improvement, patient to be admitted for MRI.  Patient reports only mild improvement to head ache, and almost no improvement to dizziness, noting \"Still really dizzy.\"   SHAMEKA [facility] made aware of recommendation for admission and MRI. Patient admitted.     Amount and/or Complexity of Data Reviewed  Labs: ordered.  Radiology: ordered.    Risk  OTC drugs.  Prescription drug management.  Decision regarding hospitalization.             Medications   sodium chloride 0.9 % bolus 1,000 mL (1,000 mL Intravenous New Bag 4/9/25 9404)   acetaminophen (TYLENOL) tablet 650 mg (650 mg Oral Given 4/9/25 1977) " "  metoclopramide (REGLAN) injection 10 mg (10 mg Intravenous Given 4/9/25 1557)   magnesium sulfate 2 g/50 mL IVPB (premix) 2 g (2 g Intravenous New Bag 4/9/25 1556)   meclizine (ANTIVERT) tablet 25 mg (25 mg Oral Given 4/9/25 1557)   rimegepant sulfate (NURTEC) disintegrating tablet 75 mg (75 mg Oral Given 4/9/25 1640)       ED Risk Strat Scores      HEART Risk Score      Flowsheet Row Most Recent Value   Heart Score Risk Calculator    History 0 Filed at: 04/09/2025 1724   ECG 0 Filed at: 04/09/2025 1724   Age 0 Filed at: 04/09/2025 1724   Risk Factors 0 Filed at: 04/09/2025 1724   Troponin 0 Filed at: 04/09/2025 1724   HEART Score 0 Filed at: 04/09/2025 1724                CRAFFT      Flowsheet Row Most Recent Value   CRAFFT Initial Screen: During the past 12 months, did you:    1. Drink any alcohol (more than a few sips)?  No Filed at: 04/09/2025 1350   2. Smoke any marijuana or hashish No Filed at: 04/09/2025 1350   3. Use anything else to get high? (\"anything else\" includes illegal drugs, over the counter and prescription drugs, and things that you sniff or 'aguilar')? No Filed at: 04/09/2025 1350              No data recorded        SBIRT 22yo+      Flowsheet Row Most Recent Value   Initial Alcohol Screen: US AUDIT-C     1. How often do you have a drink containing alcohol? 0 Filed at: 04/09/2025 1500   2. How many drinks containing alcohol do you have on a typical day you are drinking?  0 Filed at: 04/09/2025 1500   3a. Male UNDER 65: How often do you have five or more drinks on one occasion? 0 Filed at: 04/09/2025 1500   3b. FEMALE Any Age, or MALE 65+: How often do you have 4 or more drinks on one occassion? 0 Filed at: 04/09/2025 1500   Audit-C Score 0 Filed at: 04/09/2025 1500   SAULO: How many times in the past year have you...    Used an illegal drug or used a prescription medication for non-medical reasons? Never Filed at: 04/09/2025 1500                            History of Present Illness       Chief " Complaint   Patient presents with    Dizziness     Pt reports having dizziness and blurred vision since Sunday. Was seen in er Sunday for this complaint and states that it has not gotten better. Reports symptoms are constant. Had a CT        Past Medical History:   Diagnosis Date    ADHD (attention deficit hyperactivity disorder)     Allergic     Anemia     Anxiety     Asthma     Clotting disorder (HCC)     Migraines     Otitis media     Urinary tract infection       Past Surgical History:   Procedure Laterality Date    ADENOIDECTOMY      NASAL SEPTUM SURGERY      TONSILLECTOMY        Family History   Problem Relation Age of Onset    Migraines Mother     Seizures Mother     Mental illness Mother     Anxiety disorder Mother     Asthma Father     Stroke Father     ADD / ADHD Father     Diabetes Maternal Grandfather     Pulmonary embolism Maternal Grandmother     Miscarriages / Stillbirths Maternal Grandmother     Cancer Maternal Grandmother     Asthma Paternal Grandmother     Migraines Paternal Grandmother     Autoimmune disease Paternal Grandmother       Social History     Tobacco Use    Smoking status: Never     Passive exposure: Yes    Smokeless tobacco: Never   Vaping Use    Vaping status: Never Used   Substance Use Topics    Alcohol use: Never    Drug use: Never      E-Cigarette/Vaping    E-Cigarette Use Never User       E-Cigarette/Vaping Substances    Nicotine No     THC No     CBD No     Flavoring No     Other No     Unknown No       I have reviewed and agree with the history as documented.      Was seen here Sunday for dizziness, came back today for worsening symptoms plus now having left eye blurriness (denies pain).  States that Sunday she had onset of dizziness, while at rest worse with position changes. (Reports hx of migraines and vertigo but this is not like any of those) Reports had associated chest pressure, and left arm tingling. Reports left arm tingling is improving and now mostly just left hand  tingling. Patient states starting today having left eye blurriness, no loss of specific field, just global blurriness.  Notes slight right posterior neck pain.  Reports never felt better on Sunday. Reports feels off balance when walking.   On exam: hand  are strong and symmetric, notes left decrease in sensation. lower extremities are bilateral in strength and sensation. Able to walk without obvious deficits. States feels off balance when doing Romberg. No issues with finger to nose. Was seen by PCP only for a follow up to an ENT appt in March of last year. States ear infection in January but no issues since.         Review of Systems   Constitutional: Negative.    HENT: Negative.     Eyes: Negative.    Respiratory: Negative.  Negative for cough and shortness of breath.    Cardiovascular:  Positive for chest pain.   Gastrointestinal: Negative.    Endocrine: Negative.    Genitourinary: Negative.    Musculoskeletal: Negative.    Skin: Negative.    Allergic/Immunologic: Negative.    Neurological:  Positive for dizziness, numbness and headaches. Negative for seizures, syncope and speech difficulty.   Hematological: Negative.    Psychiatric/Behavioral: Negative.     All other systems reviewed and are negative.          Objective       ED Triage Vitals   Temperature Pulse Blood Pressure Respirations SpO2 Patient Position - Orthostatic VS   04/09/25 1349 04/09/25 1349 04/09/25 1349 04/09/25 1349 04/09/25 1349 04/09/25 1349   97.8 °F (36.6 °C) 100 147/70 16 97 % Sitting      Temp Source Heart Rate Source BP Location FiO2 (%) Pain Score    04/09/25 1349 04/09/25 1349 04/09/25 1349 -- 04/09/25 1500    Temporal Monitor Left arm  5      Vitals      Date and Time Temp Pulse SpO2 Resp BP Pain Score FACES Pain Rating User   04/09/25 1832 97.7 °F (36.5 °C) 96 98 % 16 155/81 -- -- DII   04/09/25 1700 -- 69 97 % 21 107/58 -- -- LK   04/09/25 1615 -- 64 97 % 21 115/71 -- -- LK   04/09/25 1600 -- 81 97 % 22 124/71 -- -- LK    04/09/25 1500 -- 79 97 % 21 120/68 5 -- LK   04/09/25 1349 97.8 °F (36.6 °C) 100 97 % 16 147/70 -- -- HR            Physical Exam  Vitals and nursing note reviewed.   Constitutional:       Appearance: Normal appearance. She is normal weight.   HENT:      Head: Normocephalic.      Right Ear: External ear normal.      Left Ear: External ear normal.      Nose: Nose normal. No congestion or rhinorrhea.      Mouth/Throat:      Mouth: Mucous membranes are moist.      Pharynx: Oropharynx is clear.   Eyes:      General: Lids are normal. Lids are everted, no foreign bodies appreciated. No allergic shiner, visual field deficit or scleral icterus.     Extraocular Movements: Extraocular movements intact.      Conjunctiva/sclera: Conjunctivae normal.      Pupils: Pupils are equal, round, and reactive to light. Pupils are equal.      Right eye: Pupil is round, reactive and not sluggish.      Left eye: Pupil is round, reactive and not sluggish.   Cardiovascular:      Rate and Rhythm: Normal rate and regular rhythm.      Pulses: Normal pulses.      Heart sounds: Normal heart sounds.   Pulmonary:      Effort: Pulmonary effort is normal. No respiratory distress.      Breath sounds: Normal breath sounds. No stridor. No wheezing, rhonchi or rales.   Chest:      Chest wall: No tenderness.   Abdominal:      General: Abdomen is flat. Bowel sounds are normal. There is no distension.      Palpations: Abdomen is soft. There is no mass.      Tenderness: There is no abdominal tenderness. There is no right CVA tenderness, left CVA tenderness, guarding or rebound.      Hernia: No hernia is present.   Musculoskeletal:         General: Normal range of motion.      Cervical back: Normal range of motion and neck supple.   Skin:     General: Skin is warm.      Capillary Refill: Capillary refill takes less than 2 seconds.   Neurological:      General: No focal deficit present.      Mental Status: She is alert and oriented to person, place, and time.  Mental status is at baseline.      GCS: GCS eye subscore is 4. GCS verbal subscore is 5. GCS motor subscore is 6.      Cranial Nerves: Cranial nerves 2-12 are intact. No cranial nerve deficit, dysarthria or facial asymmetry.      Sensory: Sensory deficit present.      Motor: Motor function is intact. No atrophy, abnormal muscle tone, seizure activity or pronator drift.      Coordination: Coordination is intact. Finger-Nose-Finger Test normal.      Gait: Gait is intact. Gait normal.      Comments: 5/5 upper extremity strength, right UE: no numbness or tingling left UE: numbness/tingling   5/5 lower extremity strength, no numbness or tingling    Psychiatric:         Mood and Affect: Mood normal.         Behavior: Behavior normal.         Thought Content: Thought content normal.         Judgment: Judgment normal.         Results Reviewed       Procedure Component Value Units Date/Time    Magnesium [[id number]]  (Normal) Collected: 04/09/25 1458    Lab Status: Final result Specimen: Blood from Arm, Right Updated: 04/09/25 1556     Magnesium 1.9 mg/dL     POCT pregnancy, urine [[id number]]  (Normal) Collected: 04/09/25 1556    Lab Status: Final result Updated: 04/09/25 1556     EXT Preg Test, Ur Negative     Control Valid    HS Troponin 0hr (reflex protocol) [[id number]]  (Normal) Collected: 04/09/25 1458    Lab Status: Final result Specimen: Blood from Arm, Right Updated: 04/09/25 1532     hs TnI 0hr <2 ng/L     Comprehensive metabolic panel [[id number]] Collected: 04/09/25 1458    Lab Status: Final result Specimen: Blood from Arm, Right Updated: 04/09/25 1527     Sodium 136 mmol/L      Potassium 3.8 mmol/L      Chloride 104 mmol/L      CO2 24 mmol/L      ANION GAP 8 mmol/L      BUN 16 mg/dL      Creatinine 0.64 mg/dL      Glucose 96 mg/dL      Calcium 9.6 mg/dL      AST 14 U/L      ALT 9 U/L      Alkaline Phosphatase 80 U/L      Total Protein 7.8 g/dL      Albumin 4.7 g/dL      Total Bilirubin 0.35 mg/dL      eGFR 128  ml/min/1.73sq m     Narrative:      [employer] guidelines for Chronic Kidney Disease (CKD):     Stage 1 with normal or high GFR (GFR > 90 mL/min/1.73 square meters)    Stage 2 Mild CKD (GFR = 60-89 mL/min/1.73 square meters)    Stage 3A Moderate CKD (GFR = 45-59 mL/min/1.73 square meters)    Stage 3B Moderate CKD (GFR = 30-44 mL/min/1.73 square meters)    Stage 4 Severe CKD (GFR = 15-29 mL/min/1.73 square meters)    Stage 5 End Stage CKD (GFR <15 mL/min/1.73 square meters)  Note: GFR calculation is accurate only with a steady state creatinine    CBC and differential [[id number]]  (Abnormal) Collected: 04/09/25 1458    Lab Status: Final result Specimen: Blood from Arm, Right Updated: 04/09/25 1509     WBC 9.07 Thousand/uL      RBC 5.18 Million/uL      Hemoglobin 14.6 g/dL      Hematocrit 45.6 %      MCV 88 fL      MCH 28.2 pg      MCHC 32.0 g/dL      RDW 11.9 %      MPV 9.6 fL      Platelets 257 Thousands/uL      nRBC 0 /100 WBCs      Segmented % 71 %      Immature Grans % 0 %      Lymphocytes % 25 %      Monocytes % 4 %      Eosinophils Relative 0 %      Basophils Relative 0 %      Absolute Neutrophils 6.39 Thousands/µL      Absolute Immature Grans 0.03 Thousand/uL      Absolute Lymphocytes 2.22 Thousands/µL      Absolute Monocytes 0.37 Thousand/µL      Eosinophils Absolute 0.02 Thousand/µL      Basophils Absolute 0.04 Thousands/µL             XR chest 1 view portable   Final Interpretation by Cris Canales MD (04/09 5640)      No acute cardiopulmonary disease.            Workstation performed: [id number]         MRI brain wo contrast    (Results Pending)       ECG 12 Lead Documentation Only    Date/Time: 4/9/2025 3:07 PM    Performed by: TORIBIO Kaur  Authorized by: TORIBIO Kaur    Indications / Diagnosis:  Chest pressure  ECG reviewed by me, the ED Provider: yes    Patient location:  ED  Interpretation:     Interpretation: normal    Rate:     ECG rate:  74    ECG rate  assessment: normal    Rhythm:     Rhythm: sinus rhythm    Ectopy:     Ectopy: none    QRS:     QRS axis:  Normal  Conduction:     Conduction: normal    ST segments:     ST segments:  Normal  T waves:     T waves: normal        ED Medication and Procedure Management   Prior to Admission Medications   Prescriptions Last Dose Informant Patient Reported? Taking?   Cholecalciferol (VITAMIN D3) 1,000 units tablet  Self Yes No   Sig: Take 2,000 Units by mouth daily   Riboflavin (B-2-400 PO)  Self Yes No   Sig: Take 400 mg by mouth in the morning   Spacer/Aero-Holding Chambers NIRU   No No   Sig: Use in the morning   albuterol (PROVENTIL HFA,VENTOLIN HFA) 90 mcg/act inhaler   No No   Sig: TAKE 2 PUFFS BY MOUTH EVERY 6 HOURS AS NEEDED FOR WHEEZE OR FOR SHORTNESS OF BREATH   hydrOXYzine HCL (ATARAX) 10 mg tablet   No No   Sig: Take 1 tablet (10 mg total) by mouth every 6 (six) hours as needed for anxiety   rizatriptan (MAXALT) 10 mg tablet   No No   Sig: Take 1 tablet at the onset of a migraine; may repeat once in 2 hours if needed. Max 3 days per week      Facility-Administered Medications: None     Current Discharge Medication List        CONTINUE these medications which have NOT CHANGED    Details   albuterol (PROVENTIL HFA,VENTOLIN HFA) 90 mcg/act inhaler TAKE 2 PUFFS BY MOUTH EVERY 6 HOURS AS NEEDED FOR WHEEZE OR FOR SHORTNESS OF BREATH  Qty: 8.5 g, Refills: 5    Comments: Substitution to a formulary equivalent within the same pharmaceutical class is authorized.  Associated Diagnoses: Mild persistent asthma with acute exacerbation      Cholecalciferol (VITAMIN D3) 1,000 units tablet Take 2,000 Units by mouth daily      hydrOXYzine HCL (ATARAX) 10 mg tablet Take 1 tablet (10 mg total) by mouth every 6 (six) hours as needed for anxiety  Qty: 30 tablet, Refills: 5    Associated Diagnoses: Anxiety      Riboflavin (B-2-400 PO) Take 400 mg by mouth in the morning      rizatriptan (MAXALT) 10 mg tablet Take 1 tablet at the  onset of a migraine; may repeat once in 2 hours if needed. Max 3 days per week  Qty: 12 tablet, Refills: 2    Associated Diagnoses: Chronic migraine without aura without status migrainosus, not intractable      Spacer/Aero-Holding Chambers NIRU Use in the morning  Qty: 1 each, Refills: 0    Associated Diagnoses: Mild persistent asthma with acute exacerbation           No discharge procedures on file.  ED SEPSIS DOCUMENTATION   Time reflects when diagnosis was documented in both MDM as applicable and the Disposition within this note       Time User Action Codes Description Comment    4/9/2025  5:23 PM Ahmet Price Add [R51.9] Headache     4/9/2025  5:24 PM Tiffany Dinh [R42] Dizziness     4/9/2025  5:24 PM Tiffany Dinh [H53.8] Blurred vision     4/9/2025  5:24 PM Tiffany Dinh [R20.0] Numbness                  TORIBIO Kaur  04/09/25 0805

## 2025-04-09 NOTE — H&P
"H&P - Hospitalist   Name: Carola Gustafson 19 y.o. female I MRN: [id number]  Unit/Bed#: ED 11 I Date of Admission: 4/9/2025   Date of Service: 4/9/2025 I Hospital Day: 0     Assessment & Plan  Atypical migraine  Approximately 4-day history of atypical migraine symptoms with headache, pressure behind left eye with blurred vision behind left eye, left hand numbness and tingling, chest pain.  Discussed with on-call neurology and admitted for MRI brain  Start migraine cocktail with dexamethasone.  Admit for observation  Asthma  No acute exacerbation  Monitor respiratory status  Anxiety  No longer taking Paxil, utilizing Atarax as needed  History of migraine headaches  History of migraine headaches while on OCPs, follows with neurology.  Since discontinuing her OCPs she has not struggled with migraines until the past week.  Has Maxalt at home but did not use this for her migraine  Recommend resuming magnesium supplementation, B2 upon discharge    VTE Pharmacologic Prophylaxis: VTE Score: 0 Low Risk (Score 0-2) - Encourage Ambulation.  Code Status: full code    Anticipated Length of Stay: Patient will be admitted on an observation basis with an anticipated length of stay of less than 2 midnights secondary to atypical migraine eating MRI brain and neurology consult.    Total Time for Visit, including Counseling / Coordination of Care: 75 Minutes. Greater than 50% of this total time spent on direct patient counseling and coordination of care.    Chief Complaint: Migraine    History of Present Illness:  Carola Gustafson is a 19 y.o. female with a PMH of migraines, asthma, anxiety who presents with headache.  Approximately 4-day history of atypical migraine symptoms with headache, pressure behind left eye with blurred vision behind left eye, left hand numbness and tingling, chest pain. Had migraines while on OCPs but none in a \"long time\" after stopping the OCPs.  Had some relief with migraine cocktail in ER but still with left hand " tingling and chest pain.    Review of Systems:  Review of Systems   Constitutional:  Negative for activity change, appetite change, chills, fatigue and fever.   HENT:  Negative for congestion, rhinorrhea, sinus pressure and sore throat.    Eyes:  Negative for photophobia, pain and visual disturbance.   Respiratory:  Negative for cough, shortness of breath and wheezing.    Cardiovascular:  Positive for chest pain. Negative for palpitations and leg swelling.   Gastrointestinal:  Negative for abdominal distention, abdominal pain, constipation, diarrhea, nausea and vomiting.   Endocrine: Negative for cold intolerance, heat intolerance, polydipsia and polyuria.   Genitourinary:  Negative for difficulty urinating, dysuria, flank pain, frequency and hematuria.   Musculoskeletal:  Negative for arthralgias, back pain and joint swelling.   Skin:  Negative for color change, pallor and rash.   Allergic/Immunologic: Negative.    Neurological:  Positive for weakness, numbness and headaches. Negative for dizziness, syncope and light-headedness.   Hematological: Negative.    Psychiatric/Behavioral: Negative.          Past Medical and Surgical History:   Past Medical History:   Diagnosis Date    ADHD (attention deficit hyperactivity disorder)     Allergic     Anemia     Anxiety     Asthma     Clotting disorder (HCC)     Migraines     Otitis media     Urinary tract infection        Past Surgical History:   Procedure Laterality Date    ADENOIDECTOMY      NASAL SEPTUM SURGERY      TONSILLECTOMY         Meds/Allergies:  Prior to Admission medications    Medication Sig Start Date End Date Taking? Authorizing Provider   albuterol (PROVENTIL HFA,VENTOLIN HFA) 90 mcg/act inhaler TAKE 2 PUFFS BY MOUTH EVERY 6 HOURS AS NEEDED FOR WHEEZE OR FOR SHORTNESS OF BREATH 10/18/24   TORIBIO La   Cholecalciferol (VITAMIN D3) 1,000 units tablet Take 2,000 Units by mouth daily    Historical Provider, MD   hydrOXYzine HCL (ATARAX) 10 mg tablet  Take 1 tablet (10 mg total) by mouth every 6 (six) hours as needed for anxiety 10/16/24   TORIBIO La   Riboflavin (B-2-400 PO) Take 400 mg by mouth in the morning    Historical Provider, MD   rizatriptan (MAXALT) 10 mg tablet Take 1 tablet at the onset of a migraine; may repeat once in 2 hours if needed. Max 3 days per week 9/8/23   TORIBIO Nguyen   Spacer/Aero-Holding Chambers NIRU Use in the morning 9/26/24   TORIBIO La   Albuterol Sulfate (albuterol, FOR EMS ONLY,) (2.5 mg/3 mL) 0.083 % nebulizer solution Take 2.5 mg by nebulization every 6 (six) hours as needed for wheezing  4/9/25  Historical Provider, MD   Ferrous Sulfate ER (Slow Fe) 45 MG TBCR Take 45 mg by mouth in the morning  4/9/25  Historical Provider, MD   FLUoxetine 20 MG tablet Take 1 tablet (20 mg total) by mouth daily 12/17/24 4/9/25  TORIBIO La   Triamcinolone Acetonide (Nasacort Allergy 24HR) 55 MCG/ACT nasal spray 1 spray by Each Nare route daily 10/16/24 4/9/25  TORIBIO La       All medications reviewed.    Allergies:   Allergies   Allergen Reactions    Ketorolac Hives     Other reaction(s): Edema-Airway  Given in an Outside ED - High dose benadryl given     Azithromycin      Other reaction(s): nausea and vomiting    Ketorocaine-L Tongue Swelling     Other reaction(s): tongue swelling    Ketorolac Tromethamine Throat Swelling and Tongue Swelling    Latex Hives and Blisters    Medical Tape Blisters     Other reaction(s): blisters    Montelukast Hives and Other (See Comments)    Nsaids Throat Swelling    Other Other (See Comments)     Adhesives, gets Blisters    Sulfamethoxazole-Trimethoprim GI Intolerance       Social History:  Marital Status: Single     Substance Use History:   Social History     Substance and Sexual Activity   Alcohol Use Never     Social History     Tobacco Use   Smoking Status Never    Passive exposure: Yes   Smokeless Tobacco Never     Social History     Substance and Sexual  Activity   Drug Use Never       Family History:  Non-contributory    Physical Exam:     Vitals:   Blood Pressure: 107/58 (04/09/25 1700)  Pulse: 69 (04/09/25 1700)  Temperature: 97.8 °F (36.6 °C) (04/09/25 1349)  Temp Source: Temporal (04/09/25 1349)  Respirations: 21 (04/09/25 1700)  SpO2: 97 % (04/09/25 1700)    Physical Exam  Vitals and nursing note reviewed.   Constitutional:       General: She is not in acute distress.     Appearance: Normal appearance. She is not ill-appearing.   HENT:      Head: Normocephalic and atraumatic.      Mouth/Throat:      Mouth: Mucous membranes are moist.   Eyes:      General: No scleral icterus.        Right eye: No discharge.         Left eye: No discharge.      Pupils: Pupils are equal, round, and reactive to light.   Cardiovascular:      Rate and Rhythm: Normal rate and regular rhythm.      Heart sounds: No murmur heard.     No friction rub. No gallop.   Pulmonary:      Effort: No respiratory distress.      Breath sounds: No wheezing, rhonchi or rales.   Abdominal:      General: Bowel sounds are normal. There is no distension.      Palpations: Abdomen is soft.      Tenderness: There is no abdominal tenderness. There is no guarding or rebound.   Musculoskeletal:         General: No swelling or deformity.      Cervical back: Neck supple.      Right lower leg: No edema.      Left lower leg: No edema.   Skin:     General: Skin is warm and dry.      Capillary Refill: Capillary refill takes less than 2 seconds.      Coloration: Skin is not jaundiced or pale.      Findings: No erythema or rash.   Neurological:      General: No focal deficit present.      Mental Status: She is alert and oriented to person, place, and time. Mental status is at baseline.      Cranial Nerves: No cranial nerve deficit.      Sensory: No sensory deficit.   Psychiatric:         Mood and Affect: Mood normal.         Behavior: Behavior normal.          Additional Data:     Lab Results:  Results from last 7 days    Lab Units 04/09/25  1458   WBC Thousand/uL 9.07   HEMOGLOBIN g/dL 14.6   HEMATOCRIT % 45.6   PLATELETS Thousands/uL 257   SEGS PCT % 71   LYMPHO PCT % 25   MONO PCT % 4   EOS PCT % 0     Results from last 7 days   Lab Units 04/09/25  1458   SODIUM mmol/L 136   POTASSIUM mmol/L 3.8   CHLORIDE mmol/L 104   CO2 mmol/L 24   BUN mg/dL 16   CREATININE mg/dL 0.64   ANION GAP mmol/L 8   CALCIUM mg/dL 9.6   ALBUMIN g/dL 4.7   TOTAL BILIRUBIN mg/dL 0.35   ALK PHOS U/L 80   ALT U/L 9   AST U/L 14   GLUCOSE RANDOM mg/dL 96                       Imaging: All pertinent imaging reviewed.  XR chest 1 view portable   Final Result by Cris Canales MD (04/09 1550)      No acute cardiopulmonary disease.            Workstation performed: [id number]         MRI brain wo contrast    (Results Pending)       EKG and Other Studies Reviewed on Admission:   Prior pertinent studies and records reviewed in [facility] Everywhere.    ** Please Note: This note has been constructed using a voice recognition system. **

## 2025-04-09 NOTE — TELEPHONE ENCOUNTER
"FOLLOW UP: Advised Pt to proceed back to the ED now. Pt agreeable with plan.     REASON FOR CONVERSATION: Chest Pain and Vision Changes    SYMPTOMS: see below    OTHER: Pt is calm, oriented, and appropriate on the phone. No obvious acute distress noted.    DISPOSITION: Go to ED Now (overriding Go to ED/UCC Now (Or to Office with PCP Approval))    Triage forwarded for PCP review.    Reason for Disposition   Chest pain lasting longer than 5 minutes and occurred in last 3 days (72 hours) (Exception: Feels exactly the same as previously diagnosed heartburn and has accompanying sour taste in mouth.)    Answer Assessment - Initial Assessment Questions  1. DESCRIPTION: \"How has your vision changed?\" (e.g., complete vision loss, blurred vision, double vision, floaters, etc.)      Pt seen for the same in ED 4/6. Referred to Neurology outpatient. Unable to schedule with Neurology until 7/29. Pt reports left eye blurred vision and both eyes shake.    2. PATTERN: \"Does this come and go, or has it been constant since it started?\"      Improves slightly when laying down. Symptoms are worse since ED visit when standing up.    3. PAIN: \"Is there any pain in your eye(s)?\"  (Scale 1-10; or mild, moderate, severe)      Does not report    4. OTHER SYMPTOMS: \"Do you have any other symptoms?\" (e.g., confusion, headache, arm or leg weakness, speech problems)      Dizziness    5. PREGNANCY: \"Is there any chance you are pregnant?\" \"When was your last menstrual period?\"        Does not report. HCG negative on 4/7    Answer Assessment - Initial Assessment Questions  1. LOCATION: \"Where does it hurt?\"        Seen in ED for the same on 4/9 and discharged. Pt reports CP was more generalized at that time. Since ED CP is mid chest    2. RADIATION: \"Does the pain go anywhere else?\" (e.g., into neck, jaw, arms, back)      Denies    3. PATTERN: \"Does the pain come and go, or has it been constant since it started?\"  \"Does it get worse with exertion?\" " "      Constant    4. SEVERITY: \"How bad is the pain?\"  (e.g., Scale 1-10; mild, moderate, or severe)      Mild/Moderate. Pt describes as pressure    5. CARDIAC RISK FACTORS: \"Do you have any history of heart problems or risk factors for heart disease?\" (e.g., angina, prior heart attack; diabetes, high blood pressure, high cholesterol, smoker, or strong family history of heart disease)      Does not report. PMH reviewed.    6. PULMONARY RISK FACTORS: \"Do you have any history of lung disease?\"  (e.g., blood clots in lung, asthma, emphysema, birth control pills)      Does not report. PMH Reviewed.    7. CAUSE: \"What do you think is causing the chest pain?\"      Unsure    8. OTHER SYMPTOMS: \"Do you have any other symptoms?\" (e.g., dizziness, nausea, vomiting, sweating, fever, difficulty breathing, cough)        Dizziness. Denies SOB. Did have fluttering like palpitations on Sunday that resolved. Denies sweating or nausea.    Protocols used: Vision Loss or Change-Adult-[place], Chest Pain-Adult-[place]    "

## 2025-04-09 NOTE — ASSESSMENT & PLAN NOTE
History of migraine headaches while on OCPs, follows with neurology.  Since discontinuing her OCPs she has not struggled with migraines until the past week.  Has Maxalt at home but did not use this for her migraine  Recommend resuming magnesium supplementation, B2 upon discharge

## 2025-04-10 VITALS
BODY MASS INDEX: 29.02 KG/M2 | HEIGHT: 64 IN | WEIGHT: 170 LBS | DIASTOLIC BLOOD PRESSURE: 61 MMHG | OXYGEN SATURATION: 97 % | TEMPERATURE: 97.3 F | RESPIRATION RATE: 16 BRPM | SYSTOLIC BLOOD PRESSURE: 99 MMHG | HEART RATE: 56 BPM

## 2025-04-10 PROBLEM — G43.909 MIGRAINE: Status: ACTIVE | Noted: 2025-04-09

## 2025-04-10 LAB
ATRIAL RATE: 59 BPM
ATRIAL RATE: 74 BPM
P AXIS: 52 DEGREES
P AXIS: 66 DEGREES
PR INTERVAL: 132 MS
PR INTERVAL: 134 MS
QRS AXIS: 49 DEGREES
QRS AXIS: 76 DEGREES
QRSD INTERVAL: 80 MS
QRSD INTERVAL: 96 MS
QT INTERVAL: 354 MS
QT INTERVAL: 434 MS
QTC INTERVAL: 392 MS
QTC INTERVAL: 429 MS
T WAVE AXIS: 48 DEGREES
T WAVE AXIS: 51 DEGREES
VENTRICULAR RATE: 59 BPM
VENTRICULAR RATE: 74 BPM

## 2025-04-10 PROCEDURE — 99204 OFFICE O/P NEW MOD 45 MIN: CPT | Performed by: PSYCHIATRY & NEUROLOGY

## 2025-04-10 PROCEDURE — 93010 ELECTROCARDIOGRAM REPORT: CPT | Performed by: INTERNAL MEDICINE

## 2025-04-10 PROCEDURE — 93005 ELECTROCARDIOGRAM TRACING: CPT

## 2025-04-10 PROCEDURE — 99239 HOSP IP/OBS DSCHRG MGMT >30: CPT | Performed by: INTERNAL MEDICINE

## 2025-04-10 RX ORDER — DIPHENHYDRAMINE HCL 25 MG
25 TABLET ORAL 2 TIMES DAILY PRN
Qty: 30 TABLET | Refills: 0 | Status: SHIPPED | OUTPATIENT
Start: 2025-04-10

## 2025-04-10 RX ORDER — METOCLOPRAMIDE 5 MG/1
5 TABLET ORAL 3 TIMES DAILY PRN
Qty: 30 TABLET | Refills: 0 | Status: SHIPPED | OUTPATIENT
Start: 2025-04-10 | End: 2025-04-16 | Stop reason: ALTCHOICE

## 2025-04-10 RX ORDER — MECLIZINE HCL 12.5 MG 12.5 MG/1
25 TABLET ORAL ONCE
Status: DISCONTINUED | OUTPATIENT
Start: 2025-04-10 | End: 2025-04-10 | Stop reason: HOSPADM

## 2025-04-10 RX ADMIN — METOCLOPRAMIDE 10 MG: 5 INJECTION, SOLUTION INTRAMUSCULAR; INTRAVENOUS at 06:02

## 2025-04-10 RX ADMIN — DIPHENHYDRAMINE HYDROCHLORIDE 25 MG: 50 INJECTION, SOLUTION INTRAMUSCULAR; INTRAVENOUS at 13:05

## 2025-04-10 RX ADMIN — METOCLOPRAMIDE 10 MG: 5 INJECTION, SOLUTION INTRAMUSCULAR; INTRAVENOUS at 13:05

## 2025-04-10 RX ADMIN — DIPHENHYDRAMINE HYDROCHLORIDE 25 MG: 50 INJECTION, SOLUTION INTRAMUSCULAR; INTRAVENOUS at 06:02

## 2025-04-10 NOTE — CASE MANAGEMENT
Case Management Assessment & Discharge Planning Note    Patient name Carola Gustafson  Location /-01 MRN [id number]  : 2005 Date 4/10/2025       Current Admission Date: 2025  Current Admission Diagnosis:Migraine   Patient Active Problem List    Diagnosis Date Noted Date Diagnosed    Migraine 2025     History of migraine headaches 2025     Eustachian tube dysfunction, bilateral 2025     Hordeolum externum of right upper eyelid 2024     Allergic rhinitis with postnasal drip 10/16/2024     Mixed obsessional thoughts and acts 2024     Mastoiditis of right side 2024     Chronic migraine without aura without status migrainosus, not intractable 09/10/2023     Asthma      Anxiety        LOS (days): 0  Geometric Mean LOS (GMLOS) (days):   Days to GMLOS:     OBJECTIVE:              Current admission status: Observation       Preferred Pharmacy:   [id number] - [place], [place] - [address]  [address] [place] [address]  Phone: [phone] Fax: [phone]    Primary Care Provider: TORIBIO Mendieta    Primary Insurance: [employer] Surveillance  Secondary Insurance:     ASSESSMENT:  Active Health Care Proxies    There are no active Health Care Proxies on file.       Advance Directives  Does patient have a Health Care POA?: No  Was patient offered paperwork?: Yes  Does patient currently have a Health Care decision maker?: No  Does patient have Advance Directives?: No  Was patient offered paperwork?: Yes  Primary Contact: mother Kilgore.         Readmission Root Cause  30 Day Readmission: No    Patient Information  Admitted from:: Home  Mental Status: Alert  During Assessment patient was accompanied by: Parent  Assessment information provided by:: Patient  Primary Caregiver: Self  Support Systems: Self, Spouse/significant other, Parent, Family members, Friends/neighbors  County of Residence: [place]  What city do you live  in?: [place]  Home entry access options. Select all that apply.: No steps to enter home  Type of Current Residence: [facility]  Living Arrangements: Lives w/ Parent(s)  Is patient a ?: No    Activities of Daily Living Prior to Admission  Functional Status: Independent  Completes ADLs independently?: Yes  Ambulates independently?: Yes  Does patient use assisted devices?: Yes  Assisted Devices (DME) used: Nebulizer  DME Company Name (respiratory supplies): Pt unsure of supplier.  Does patient currently own DME?: Yes  What DME does the patient currently own?: Nebulizer  Does patient have a history of Outpatient Therapy (PT/OT)?: Yes (Not current.)  Does the patient have a history of Short-Term Rehab?: No  Does patient have a history of HHC?: No  Does patient currently have HHC?: No         Patient Information Continued  Income Source: Employed  Does patient have prescription coverage?: Yes  Can the patient afford their medications and any related supplies (such as glucometers or test strips)?: Yes  Does patient receive dialysis treatments?: No  Does patient have a history of substance abuse?: No  Does patient have a history of Mental Health Diagnosis?: Yes  Has patient received inpatient treatment related to mental health in the last 2 years?: No         Means of Transportation  Means of Transport to Appts:: Drives Self          DISCHARGE DETAILS:    Discharge planning discussed with:: Pt and pt's mother.  Freedom of Choice: Yes     CM contacted family/caregiver?: Yes  Were Treatment Team discharge recommendations reviewed with patient/caregiver?: Yes  Did patient/caregiver verbalize understanding of patient care needs?: Yes  Were patient/caregiver advised of the risks associated with not following Treatment Team discharge recommendations?: Yes    Contacts  Patient Contacts: mother Kilgore.  Relationship to Patient:: Family  Contact Method: In Person  Reason/Outcome: Discharge Planning, Emergency  Contact    Requested Home Health Care         Is the patient interested in HHC at discharge?: No    DME Referral Provided  Referral made for DME?: No    Other Referral/Resources/Interventions Provided:  Interventions: None Indicated         Treatment Team Recommendation: Home  Discharge Destination Plan:: Home                                         Additional Comments: CM met with pt and pt's mother at bedside to discern discharge needs. Pt lives with parents in a 1sh, 0ste, 1st fl setup. Pt is independent with walking and ADLs PTA. Employed and drives. Owns and uses nebulizer as needed. Hx of OPPT. No STR, HHC, inpatient psych, or D&A treatment. Preferred pharm is [facility] [place]. No medical POA. Pt's mother will provide transport.

## 2025-04-10 NOTE — PLAN OF CARE
Problem: PAIN - ADULT  Goal: Verbalizes/displays adequate comfort level or baseline comfort level  Description: Interventions:- Encourage patient to monitor pain and request assistance- Assess pain using appropriate pain scale- Administer analgesics based on type and severity of pain and evaluate response- Implement non-pharmacological measures as appropriate and evaluate response- Consider cultural and social influences on pain and pain management- Notify physician/advanced practitioner if interventions unsuccessful or patient reports new pain  Outcome: Progressing     Problem: INFECTION - ADULT  Goal: Absence of fever/infection during neutropenic period  Description: INTERVENTIONS:- Monitor WBC  Outcome: Progressing     Problem: SAFETY ADULT  Goal: Maintain or return to baseline ADL function  Description: INTERVENTIONS:-  Assess patient's ability to carry out ADLs; assess patient's baseline for ADL function and identify physical deficits which impact ability to perform ADLs (bathing, care of mouth/teeth, toileting, grooming, dressing, etc.)- Assess/evaluate cause of self-care deficits - Assess range of motion- Assess patient's mobility; develop plan if impaired- Assess patient's need for assistive devices and provide as appropriate- Encourage maximum independence but intervene and supervise when necessary- Involve family in performance of ADLs- Assess for home care needs following discharge - Consider OT consult to assist with ADL evaluation and planning for discharge- Provide patient education as appropriate  Outcome: Progressing     Problem: SAFETY ADULT  Goal: Patient will remain free of falls  Description: INTERVENTIONS:- Educate patient/family on patient safety including physical limitations- Instruct patient to call for assistance with activity - Consult OT/PT to assist with strengthening/mobility - Keep Call bell within reach- Keep bed low and locked with side rails adjusted as appropriate- Keep care  items and personal belongings within reach- Initiate and maintain comfort rounds- Make Fall Risk Sign visible to staff- Offer Toileting every 2 Hours, in advance of need- Initiate/Maintain bed/chair alarm- Obtain necessary fall risk management equipment: yellow socks/bracelet- Apply yellow socks and bracelet for high fall risk patients- Consider moving patient to room near nurses station  Outcome: Progressing

## 2025-04-10 NOTE — CONSULTS
Consultation - Neurology   Name: Carola Gustafson 20 y.o. female I MRN: [id number]  Unit/Bed#: -01 I Date of Admission: 4/9/2025   Date of Service: 4/10/2025 I Hospital Day: 0   Inpatient consult to Neurology  Consult performed by: TORIBIO Fernandes  Consult ordered by: Ahmet Price PA-C        Physician Requesting Evaluation: Ela Ruiz MD   Reason for Evaluation / Principal Problem: Headache    Assessment & Plan  Migraine  20 y.o.  female with history of asthma, anxiety and migraines who presents with a 4-day history for constellation of symptoms.     Patient reported she began experiencing LUE paresthesias, L eye blurry vision, nausea/vomiting, right sided MSK neck pain, room spinning sensation with head movements and chest pressure on 4/6. She denied headache/migraine, injury/trauma to arm or neck, fevers, URI or flu like symptoms.  She was seen in the ED where workup was unremarkable.  She was given Ativan for anxiety and migraine cocktail and was ultimately discharged.     She represents to the hospital for continued dizziness, L hand paresthesias, nausea/vomiting, left eye blurry vision and MSK neck pain.      BP on arrival 147/70.  CBC, CMP and Magnesium WNL  Prior CTA head/neck (4/6/25) unremarkable  MRI brain wo (4/9) unremarkable for acute intracranial abnormality, incidental Tarlov cyst noted    On neuro exam, she reported her symptoms are resolved aside from some nausea and some paresthesias in her left fingertips.     Plan:  - No need for further neurodiagnostic imaging at this time  - s/p IV Decadron 10 mg x 1, IV magnesium sulfate 2 g x 1, Meclizine x 1, Nurtec x 1, and IV fluid bolus  - Patient reported improvement with PO Meclizine, IV reglan 10 mg q8 hr and IV Benadryl 25 mg q8 hr   - Avoid NSAID's due to documented anaphylactic reaction  - Medical management and supportive care per primary team. Correction of any metabolic or infectious disturbances.     Plan discussed with  Attending Neurologist, please see attestation for further input/recommendations.       Carola Gustafson has neurology appointment with  Neurology 4/15/25 which she should plan to attend    History of Present Illness   Carola Gustafson is a 20 y.o.  female with history of asthma, anxiety and migraines who presents with 4-day constellation of symptoms. Patient reported she began experiencing LUE paresthesias, L eye blurry vision, nausea/vomiting, right sided MSK neck pain, room spinning sensation with head movements and chest pressure on 4/6. She denied headache/migraine, injury/trauma to arm or neck, fevers, URI or flu like symptoms.      She was seen in  ED on 4/6 for similar presentation.  CTA head/neck unremarkable.  Patient had been previously seen at PCP in March 2025 for bilateral eustachian tube dysfunction with ongoing ear pain/fullness for a few months.  She was given Ativan for anxiety and migraine cocktail.  She was discharged from the ED.    She represents to the hospital for continued L hand paresthesias, nausea, left eye blurry vision and L sided neck pain. BP on arrival 147/70.  CBC/CMP unremarkable.  Magnesium WNL.  On the ED, she was given IV Decadron, Tylenol, Benadryl, magnesium sulfate, meclizine, Reglan, Nurtec, Atarax and IV fluid bolus which she reported was effective (unsure which exact medication helped).      Patient reports a history of migraines while on OCPs, however her migraines had resolved once this was stopped. Per chart review, patient previously saw outpatient [facility] neurology for history of migraine.  She was evaluated in September 2023 with TORIBIO Curiel.  Patient previously tried rizatriptan and Fioricet which were not significantly effective.  Patient wished to trial Emgality for preventative medication, however this was denied by her insurance.  For abortive medication, she was continued on rizatriptan as she did report some effectiveness and Tylenol and/or Compazine.   Patient has anaphylactic reaction to NSAID.    She reports getting headaches a few times a year since being off OCPs. She denied any medication changes.    Prior imaging including MRI brain with/without contrast completed in October 2023 unremarkable.      Review of Systems   Constitutional:  Negative for chills and diaphoresis.   HENT:  Negative for congestion, ear pain, sinus pressure and sore throat.    Eyes:  Positive for visual disturbance (L eye blurry vision). Negative for photophobia.   Respiratory:  Negative for cough and shortness of breath.    Cardiovascular:  Negative for chest pain and palpitations.   Gastrointestinal:  Positive for nausea.   Musculoskeletal:  Negative for gait problem.   Skin:  Negative for color change and pallor.   Neurological:  Positive for dizziness (with positional movements). Negative for tremors, facial asymmetry, speech difficulty, weakness, light-headedness and headaches.        L hand paresthesias    Psychiatric/Behavioral:  Negative for confusion. The patient is not nervous/anxious.         Historical Information   Past Medical History:   Diagnosis Date    ADHD (attention deficit hyperactivity disorder)     Allergic     Anemia     Anxiety     Asthma     Clotting disorder (HCC)     Migraines     Otitis media     Urinary tract infection      Past Surgical History:   Procedure Laterality Date    ADENOIDECTOMY      NASAL SEPTUM SURGERY      TONSILLECTOMY       Social History     Tobacco Use    Smoking status: Never     Passive exposure: Yes    Smokeless tobacco: Never   Vaping Use    Vaping status: Never Used   Substance and Sexual Activity    Alcohol use: Never    Drug use: Never    Sexual activity: Yes     Partners: Male     Birth control/protection: Rhythm     E-Cigarette/Vaping    E-Cigarette Use Never User      E-Cigarette/Vaping Substances    Nicotine No     THC No     CBD No     Flavoring No     Other No     Unknown No      Family History   Problem Relation Age of Onset     Migraines Mother     Seizures Mother     Mental illness Mother     Anxiety disorder Mother     Asthma Father     Stroke Father     ADD / ADHD Father     Diabetes Maternal Grandfather     Pulmonary embolism Maternal Grandmother     Miscarriages / Stillbirths Maternal Grandmother     Cancer Maternal Grandmother     Asthma Paternal Grandmother     Migraines Paternal Grandmother     Autoimmune disease Paternal Grandmother      Social History     Tobacco Use    Smoking status: Never     Passive exposure: Yes    Smokeless tobacco: Never   Vaping Use    Vaping status: Never Used   Substance and Sexual Activity    Alcohol use: Never    Drug use: Never    Sexual activity: Yes     Partners: Male     Birth control/protection: Rhythm       Current Facility-Administered Medications:     acetaminophen (TYLENOL) tablet 650 mg, Q6H PRN    diphenhydrAMINE (BENADRYL) injection 25 mg, Q8H NINFA    hydrOXYzine HCL (ATARAX) tablet 10 mg, Q6H PRN    metoclopramide (REGLAN) injection 10 mg, Q8H NINFA    ondansetron (ZOFRAN) injection 4 mg, Q6H PRN  Prior to Admission Medications   Prescriptions Last Dose Informant Patient Reported? Taking?   Cholecalciferol (VITAMIN D3) 1,000 units tablet 4/8/2025 Self Yes Yes   Sig: Take 2,000 Units by mouth daily   Riboflavin (B-2-400 PO) 4/8/2025 Self Yes Yes   Sig: Take 400 mg by mouth in the morning   Spacer/Aero-Holding Chambers NIRU   No No   Sig: Use in the morning   albuterol (PROVENTIL HFA,VENTOLIN HFA) 90 mcg/act inhaler Past Month  No Yes   Sig: TAKE 2 PUFFS BY MOUTH EVERY 6 HOURS AS NEEDED FOR WHEEZE OR FOR SHORTNESS OF BREATH   hydrOXYzine HCL (ATARAX) 10 mg tablet More than a month  No No   Sig: Take 1 tablet (10 mg total) by mouth every 6 (six) hours as needed for anxiety   rizatriptan (MAXALT) 10 mg tablet More than a month  No No   Sig: Take 1 tablet at the onset of a migraine; may repeat once in 2 hours if needed. Max 3 days per week      Facility-Administered Medications: None      Ketorolac, Azithromycin, Ketorocaine-l, Ketorolac tromethamine, Latex, Medical tape, Montelukast, Nsaids, Other, and Sulfamethoxazole-trimethoprim    Objective :  Temp:  [97.7 °F (36.5 °C)-97.9 °F (36.6 °C)] 97.9 °F (36.6 °C)  HR:  [] 78  BP: (107-155)/(58-81) 113/59  Resp:  [14-22] 14  SpO2:  [97 %-98 %] 98 %  O2 Device: None (Room air)    Physical Exam  Vitals reviewed. Chaperone present: family present at bedside.   Constitutional:       General: She is not in acute distress.     Appearance: Normal appearance. She is normal weight. She is not ill-appearing.   HENT:      Head: Normocephalic and atraumatic.      Right Ear: External ear normal.      Left Ear: External ear normal.      Nose: Nose normal.      Mouth/Throat:      Mouth: Mucous membranes are moist.   Eyes:      General:         Right eye: No discharge.         Left eye: No discharge.      Extraocular Movements: Extraocular movements intact and EOM normal.      Conjunctiva/sclera: Conjunctivae normal.      Pupils: Pupils are equal, round, and reactive to light.   Cardiovascular:      Rate and Rhythm: Normal rate.   Pulmonary:      Effort: Pulmonary effort is normal. No respiratory distress.   Musculoskeletal:         General: Normal range of motion.      Right lower leg: No edema.      Left lower leg: No edema.   Skin:     General: Skin is warm and dry.      Coloration: Skin is not jaundiced or pale.   Neurological:      Mental Status: She is alert and oriented to person, place, and time.      Motor: Motor strength is normal.     Coordination: Finger-Nose-Finger Test normal.      Comments: See below   Psychiatric:         Speech: Speech normal.       Neurologic Exam     Mental Status   Oriented to person, place, and time.   Follows 2 step commands.   Attention: normal. Concentration: normal.   Speech: speech is normal   Level of consciousness: alert    Cranial Nerves     CN II   Visual fields full to confrontation.     CN III, IV, VI   Pupils  are equal, round, and reactive to light.  Extraocular motions are normal.   Nystagmus: none   Diplopia: none  Upgaze: normal  Downgaze: normal  Conjugate gaze: present    CN V   Facial sensation intact.     CN VII   Facial expression full, symmetric.     CN VIII   CN VIII normal.     CN XII   CN XII normal.     Motor Exam   Muscle bulk: normal    Strength   Strength 5/5 throughout.     Sensory Exam   Light touch normal.     Gait, Coordination, and Reflexes     Coordination   Finger to nose coordination: normal    Tremor   Resting tremor: absent  Intention tremor: absent        Lab Results: I have reviewed the following results:I have personally reviewed pertinent reports.  , CBC:   Results from last 7 days   Lab Units 04/09/25  1458 04/06/25  2339   WBC Thousand/uL 9.07 11.22*   RBC Million/uL 5.18* 5.11   HEMOGLOBIN g/dL 14.6 14.5   HEMATOCRIT % 45.6 45.1   MCV fL 88 88   PLATELETS Thousands/uL 257 294   , BMP/CMP:   Results from last 7 days   Lab Units 04/09/25  1458 04/06/25  2339   SODIUM mmol/L 136 139   POTASSIUM mmol/L 3.8 3.7   CHLORIDE mmol/L 104 107   CO2 mmol/L 24 23   BUN mg/dL 16 13   CREATININE mg/dL 0.64 0.66   CALCIUM mg/dL 9.6 9.8   AST U/L 14 16   ALT U/L 9 13   ALK PHOS U/L 80 92   EGFR ml/min/1.73sq m 128 127     Recent Labs     04/09/25  1458   WBC 9.07   HGB 14.6   HCT 45.6      SODIUM 136   K 3.8      CO2 24   BUN 16   CREATININE 0.64   GLUC 96   MG 1.9     Imaging Results Review: MRI brain, prior CTA head/neck    VTE Prophylaxis: VTE covered by:    None

## 2025-04-10 NOTE — DISCHARGE SUMMARY
Discharge Summary - Hospitalist   Name: Carola Gustafson 20 y.o. female I MRN: [id number]  Unit/Bed#: -01 I Date of Admission: 4/9/2025   Date of Service: 4/10/2025 I Hospital Day: 0     Assessment & Plan  Migraine  Approximately 4-day history of atypical migraine symptoms with headache, pressure behind left eye with blurred vision behind left eye, left hand numbness and tingling, chest pain.  Discussed with on-call neurology and admitted for MRI brain  Start migraine cocktail with dexamethasone.  Admit for observation  MRI brain showed no acute infarct  Asthma  No acute exacerbation  Monitor respiratory status  Anxiety  No longer taking Paxil, utilizing Atarax as needed  History of migraine headaches  History of migraine headaches while on OCPs, follows with neurology.  Since discontinuing her OCPs she has not struggled with migraines until the past week.  Has Maxalt at home but did not use this for her migraine  Recommend resuming magnesium supplementation, B2 upon discharge   Hospital Course:     Carola Gustafson is a 20 y.o. female patient who originally presented to the hospital on   Admission Orders (From admission, onward)       Ordered        04/09/25 1725  Place in Observation  Once                         due to headache, blurred vision, left hand numbness, tingling.  Patient was admitted with atypical migraine and was seen by neurology.  Patient underwent MRI brain which did not show any acute infarct.  Patient was treated for migraine with Decadron, magnesium, Benadryl and Reglan.  Patient also was given Nurtec.  Patient symptoms are improved and neurology cleared patient for discharge on oral Reglan and Benadryl and outpatient follow-up with them.  On Exam-  Chest-bilateral air entry, clear to auscultation  Abdomen-soft, nontender  Heart-S1 S2 regular  Extremities-no pedal edema or calf tenderness  Neuro-alert awake oriented x3.  No focal deficits    Please see above list of diagnoses and related plan for  "additional information.   Follow-up with PCP and neurology as outpatient    CONSULTING PROVIDERS   IP CONSULT TO NEUROLOGY    PROCEDURES PERFORMED  * No surgery found *    RADIOLOGY RESULTS  MRI brain wo contrast  Addendum Date: 4/10/2025  Addendum: ADDENDUM: This addendum addresses the findings section of the report, specifically the \"Extracranial Soft Tissues\" section. 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 This was present on prior imaging from 9/29/23 and is unchanged in size.    Result Date: 4/10/2025  Narrative: MRI BRAIN WITHOUT CONTRAST INDICATION: stroke. COMPARISON:   CTA head/neck 4/7/25 TECHNIQUE:  Multiplanar, multisequence imaging of the brain was performed. IMAGE QUALITY:  Diagnostic. FINDINGS: BRAIN PARENCHYMA:  There is no discrete mass, mass effect or midline shift. There is no intracranial hemorrhage.  There is no evidence of acute infarction and diffusion imaging is unremarkable.  There are no white matter changes in the cerebral hemispheres. VENTRICLES:  Normal for the patient's age. SELLA AND PITUITARY GLAND:  Normal. ORBITS:  Normal. PARANASAL SINUSES:  Normal. VASCULATURE:  Evaluation of the major intracranial vasculature demonstrates appropriate flow voids. CALVARIUM AND SKULL BASE:  Normal. EXTRACRANIAL SOFT TISSUES incidentally noted is a Tarlov cyst.     Impression: No acute intracranial abnormality. Workstation performed: [id number]     XR chest 1 view portable  Result Date: 4/9/2025  Narrative: XR CHEST PORTABLE INDICATION: chest pain. COMPARISON: 4/6/2025. FINDINGS: Clear lungs. No pneumothorax or pleural effusion. Normal cardiomediastinal silhouette. Bones are unremarkable for age. Normal upper abdomen.     Impression: No acute cardiopulmonary disease. Workstation performed: [id number]     XR chest portable  Result Date: 4/7/2025  Narrative: XR CHEST PORTABLE INDICATION: Acute Resp Failure. COMPARISON: CTA neck 4/7/2025, CXR 9/18/2019. " FINDINGS: Clear lungs. No pneumothorax or pleural effusion. Normal cardiomediastinal silhouette. Bones are unremarkable for age. Normal upper abdomen.     Impression: No acute cardiopulmonary disease. Workstation performed: [id number]     CTA head and neck with and without contrast  Result Date: 4/7/2025  Narrative: CTA NECK AND BRAIN WITH AND WITHOUT CONTRAST INDICATION: vertigo COMPARISON:   Multiple priors most recently 9/29/2023 TECHNIQUE:  Routine CT imaging of the Brain without contrast.Post contrast imaging was performed after administration of iodinated contrast through the neck and brain. Post contrast axial 0.625 mm images timed to opacify the arterial system.  3D rendering was performed on an independent workstation.   MIP reconstructions performed. Coronal and sagittal reconstructions were performed of the non contrast portion of the brain. Radiation dose length product (DLP) for this visit:  1311.38 mGy-cm .  This examination, like all CT scans performed in the [facility], was performed utilizing techniques to minimize radiation dose exposure, including the use of iterative reconstruction and automated exposure control. IV Contrast:  100 mL of iohexol IMAGE QUALITY:   Diagnostic FINDINGS: NONCONTRAST BRAIN PARENCHYMA:No intracranial mass, mass effect or midline shift. No CT signs of acute infarction.  No acute parenchymal hemorrhage. VENTRICLES AND EXTRA-AXIAL SPACES:Normal for the patient's age. VISUALIZED ORBITS: Normal. PARANASAL SINUSES: Normal. CTA NECK ARCH AND GREAT VESSELS: Visualized arch and great vessels are normal. VERTEBRAL ARTERIES: Patent extracranial segments. RIGHT CAROTID: No stenosis.    No dissection. LEFT CAROTID: No stenosis.    No dissection. NASCET criteria was used to determine the degree of internal carotid artery diameter stenosis. CTA BRAIN: INTERNAL CAROTID ARTERIES: No stenosis or occlusion. ANTERIOR CEREBRAL ARTERY CIRCULATION:  No stenosis or occlusion.  "MIDDLE CEREBRAL ARTERY CIRCULATION:  No stenosis or occlusion. DISTAL VERTEBRAL ARTERIES:  No stenosis or occlusion. BASILAR ARTERY:  No stenosis or occlusion. POSTERIOR CEREBRAL ARTERIES: No stenosis or occlusion. Left fetal origin VENOUS STRUCTURES:  Normal. NON VASCULAR ANATOMY BONY STRUCTURES:  No acute osseous abnormality. SOFT TISSUES OF THE NECK:  Normal. THORACIC INLET:  Unremarkable.     Impression: CT Brain:  No acute intracranial abnormality. CT Angiography:  Unremarkable CTA neck and brain. Workstation performed: [id number]       LABS  Results from last 7 days   Lab Units 04/09/25  1458 04/06/25  2339   WBC Thousand/uL 9.07 11.22*   HEMOGLOBIN g/dL 14.6 14.5   HEMATOCRIT % 45.6 45.1   MCV fL 88 88   PLATELETS Thousands/uL 257 294     Results from last 7 days   Lab Units 04/09/25  1458 04/06/25  2339   SODIUM mmol/L 136 139   POTASSIUM mmol/L 3.8 3.7   CHLORIDE mmol/L 104 107   CO2 mmol/L 24 23   BUN mg/dL 16 13   CREATININE mg/dL 0.64 0.66   CALCIUM mg/dL 9.6 9.8   ALBUMIN g/dL 4.7 4.8   TOTAL BILIRUBIN mg/dL 0.35 0.37   ALK PHOS U/L 80 92   ALT U/L 9 13   AST U/L 14 16   EGFR ml/min/1.73sq m 128 127   GLUCOSE RANDOM mg/dL 96 89     Results from last 7 days   Lab Units 04/09/25  1458   HS TNI 0HR ng/L <2                      Results from last 7 days   Lab Units 04/06/25  2339   TSH 3RD GENERATON uIU/mL 2.021               Cultures:         Invalid input(s): \"URIBILINOGEN\"              Condition at Discharge:  good      Discharge instructions/Information to patient and family:   See after visit summary for information provided to patient and family.      Provisions for Follow-Up Care:  See after visit summary for information related to follow-up care and any pertinent home health orders.      Disposition:     Home       Discharge Statement:  I spent 40 minutes discharging the patient. This time was spent on the day of discharge. I had direct contact with the patient on the day of discharge. Greater than " 50% of the total time was spent examining patient, answering all patient questions, arranging and discussing plan of care with patient as well as directly providing post-discharge instructions.  Additional time then spent on discharge activities.    Discharge Medications:  See after visit summary for reconciled discharge medications provided to patient and family.      ** Please Note: This note has been constructed using a voice recognition system **

## 2025-04-10 NOTE — ASSESSMENT & PLAN NOTE
20 y.o.  female with history of asthma, anxiety and migraines who presents with a 4-day history for constellation of symptoms.     Patient reported she began experiencing LUE paresthesias, L eye blurry vision, nausea/vomiting, right sided MSK neck pain, room spinning sensation with head movements and chest pressure on 4/6. She denied headache/migraine, injury/trauma to arm or neck, fevers, URI or flu like symptoms.  She was seen in the ED where workup was unremarkable.  She was given Ativan for anxiety and migraine cocktail and was ultimately discharged.     She represents to the hospital for continued dizziness, L hand paresthesias, nausea/vomiting, left eye blurry vision and MSK neck pain.      BP on arrival 147/70.  CBC, CMP and Magnesium WNL  Prior CTA head/neck (4/6/25) unremarkable  MRI brain wo (4/9) unremarkable for acute intracranial abnormality, incidental Tarlov cyst noted    On neuro exam, she reported her symptoms are resolved aside from some nausea and some paresthesias in her left fingertips.     Plan:  - No need for further neurodiagnostic imaging at this time  - s/p IV Decadron 10 mg x 1, IV magnesium sulfate 2 g x 1, Meclizine x 1, Nurtec x 1, and IV fluid bolus  - Patient reported improvement with PO Meclizine, IV reglan 10 mg q8 hr and IV Benadryl 25 mg q8 hr   - Avoid NSAID's due to documented anaphylactic reaction  - Medical management and supportive care per primary team. Correction of any metabolic or infectious disturbances.     Plan discussed with Attending Neurologist, please see attestation for further input/recommendations.

## 2025-04-10 NOTE — PLAN OF CARE
Problem: PAIN - ADULT  Goal: Verbalizes/displays adequate comfort level or baseline comfort level  Description: Interventions:- Encourage patient to monitor pain and request assistance- Assess pain using appropriate pain scale- Administer analgesics based on type and severity of pain and evaluate response- Implement non-pharmacological measures as appropriate and evaluate response- Consider cultural and social influences on pain and pain management- Notify physician/advanced practitioner if interventions unsuccessful or patient reports new pain  4/10/2025 1121 by Bety Dominguez RN  Outcome: Progressing  4/10/2025 0733 by Bety Dominguez RN  Outcome: Progressing     Problem: INFECTION - ADULT  Goal: Absence or prevention of progression during hospitalization  Description: INTERVENTIONS:- Assess and monitor for signs and symptoms of infection- Monitor lab/diagnostic results- Monitor all insertion sites, i.e. indwelling lines, tubes, and drains- Monitor endotracheal if appropriate and nasal secretions for changes in amount and color- Pinson appropriate cooling/warming therapies per order- Administer medications as ordered- Instruct and encourage patient and family to use good hand hygiene technique- Identify and instruct in appropriate isolation precautions for identified infection/condition  4/10/2025 1121 by Bety Dominguez RN  Outcome: Progressing  4/10/2025 0733 by Bety Dominguez RN  Outcome: Progressing     Problem: INFECTION - ADULT  Goal: Absence of fever/infection during neutropenic period  Description: INTERVENTIONS:- Monitor WBC  4/10/2025 1121 by Bety Dominguez RN  Outcome: Progressing  4/10/2025 0733 by Bety Dominguez RN  Outcome: Progressing

## 2025-04-10 NOTE — PLAN OF CARE
Problem: Potential for Falls  Goal: Patient will remain free of falls  Description: INTERVENTIONS:- Educate patient/family on patient safety including physical limitations- Instruct patient to call for assistance with activity - Consult OT/PT to assist with strengthening/mobility - Keep Call bell within reach- Keep bed low and locked with side rails adjusted as appropriate- Keep care items and personal belongings within reach- Initiate and maintain comfort rounds- Make Fall Risk Sign visible to staff- Offer Toileting every  Hours, in advance of need- Initiate/Maintain alarm- Obtain necessary fall risk management equipment: - Apply yellow socks and bracelet for high fall risk patients- Consider moving patient to room near nurses station  INTERVENTIONS:- Educate patient/family on patient safety including physical limitations- Instruct patient to call for assistance with activity - Consult OT/PT to assist with strengthening/mobility - Keep Call bell within reach- Keep bed low and locked with side rails adjusted as appropriate- Keep care items and personal belongings within reach- Initiate and maintain comfort rounds- Make Fall Risk Sign visible to staff- Offer Toileting every  Hours, in advance of need- Initiate/Maintain alarm- Obtain necessary fall risk management equipment: - Apply yellow socks and bracelet for high fall risk patients- Consider moving patient to room near nurses station  Outcome: Progressing     Problem: PAIN - ADULT  Goal: Verbalizes/displays adequate comfort level or baseline comfort level  Description: Interventions:- Encourage patient to monitor pain and request assistance- Assess pain using appropriate pain scale- Administer analgesics based on type and severity of pain and evaluate response- Implement non-pharmacological measures as appropriate and evaluate response- Consider cultural and social influences on pain and pain management- Notify physician/advanced practitioner if interventions  unsuccessful or patient reports new pain  Outcome: Progressing     Problem: INFECTION - ADULT  Goal: Absence or prevention of progression during hospitalization  Description: INTERVENTIONS:- Assess and monitor for signs and symptoms of infection- Monitor lab/diagnostic results- Monitor all insertion sites, i.e. indwelling lines, tubes, and drains- Monitor endotracheal if appropriate and nasal secretions for changes in amount and color- [place] appropriate cooling/warming therapies per order- Administer medications as ordered- Instruct and encourage patient and family to use good hand hygiene technique- Identify and instruct in appropriate isolation precautions for identified infection/condition  Outcome: Progressing  Goal: Absence of fever/infection during neutropenic period  Description: INTERVENTIONS:- Monitor WBC  Outcome: Progressing     Problem: SAFETY ADULT  Goal: Patient will remain free of falls  Description: INTERVENTIONS:- Educate patient/family on patient safety including physical limitations- Instruct patient to call for assistance with activity - Consult OT/PT to assist with strengthening/mobility - Keep Call bell within reach- Keep bed low and locked with side rails adjusted as appropriate- Keep care items and personal belongings within reach- Initiate and maintain comfort rounds- Make Fall Risk Sign visible to staff- Offer Toileting every  Hours, in advance of need- Initiate/Maintain alarm- Obtain necessary fall risk management equipment: - Apply yellow socks and bracelet for high fall risk patients- Consider moving patient to room near nurses station  INTERVENTIONS:- Educate patient/family on patient safety including physical limitations- Instruct patient to call for assistance with activity - Consult OT/PT to assist with strengthening/mobility - Keep Call bell within reach- Keep bed low and locked with side rails adjusted as appropriate- Keep care items and personal belongings within reach-  Initiate and maintain comfort rounds- Make Fall Risk Sign visible to staff- Offer Toileting every  Hours, in advance of need- Initiate/Maintain alarm- Obtain necessary fall risk management equipment: - Apply yellow socks and bracelet for high fall risk patients- Consider moving patient to room near nurses station  Outcome: Progressing  Goal: Maintain or return to baseline ADL function  Description: INTERVENTIONS:-  Assess patient's ability to carry out ADLs; assess patient's baseline for ADL function and identify physical deficits which impact ability to perform ADLs (bathing, care of mouth/teeth, toileting, grooming, dressing, etc.)- Assess/evaluate cause of self-care deficits - Assess range of motion- Assess patient's mobility; develop plan if impaired- Assess patient's need for assistive devices and provide as appropriate- Encourage maximum independence but intervene and supervise when necessary- Involve family in performance of ADLs- Assess for home care needs following discharge - Consider OT consult to assist with ADL evaluation and planning for discharge- Provide patient education as appropriate  Outcome: Progressing  Goal: Maintains/Returns to pre admission functional level  Description: INTERVENTIONS:- Perform AM-PAC 6 Click Basic Mobility/ Daily Activity assessment daily.- Set and communicate daily mobility goal to care team and patient/family/caregiver. - Collaborate with rehabilitation services on mobility goals if consulted- Perform Range of Motion  times a day.- Reposition patient every  hours.- Dangle patient  times a day- Stand patient  times a day- Ambulate patient  times a day- Out of bed to chair  times a day - Out of bed for meals times a day- Out of bed for toileting- Record patient progress and toleration of activity level   Outcome: Progressing     Problem: DISCHARGE PLANNING  Goal: Discharge to home or other facility with appropriate resources  Description: INTERVENTIONS:- Identify barriers  to discharge w/patient and caregiver- Arrange for needed discharge resources and transportation as appropriate- Identify discharge learning needs (meds, wound care, etc.)- Arrange for interpretive services to assist at discharge as needed- Refer to Case Management Department for coordinating discharge planning if the patient needs post-hospital services based on physician/advanced practitioner order or complex needs related to functional status, cognitive ability, or social support system  Outcome: Progressing     Problem: Knowledge Deficit  Goal: Patient/family/caregiver demonstrates understanding of disease process, treatment plan, medications, and discharge instructions  Description: Complete learning assessment and assess knowledge base.Interventions:- Provide teaching at level of understanding- Provide teaching via preferred learning methods  Outcome: Progressing     Problem: Nutrition/Hydration-ADULT  Goal: Nutrient/Hydration intake appropriate for improving, restoring or maintaining nutritional needs  Description: Monitor and assess patient's nutrition/hydration status for malnutrition. Collaborate with interdisciplinary team and initiate plan and interventions as ordered.  Monitor patient's weight and dietary intake as ordered or per policy. Utilize nutrition screening tool and intervene as necessary. Determine patient's food preferences and provide high-protein, high-caloric foods as appropriate. INTERVENTIONS:- Monitor oral intake, urinary output, labs, and treatment plans- Assess nutrition and hydration status and recommend course of action- Evaluate amount of meals eaten- Assist patient with eating if necessary - Allow adequate time for meals- Recommend/ encourage appropriate diets, oral nutritional supplements, and vitamin/mineral supplements- Order, calculate, and assess calorie counts as needed- Recommend, monitor, and adjust tube feedings and TPN/PPN based on assessed needs- Assess need for  intravenous fluids- Provide specific nutrition/hydration education as appropriate- Include patient/family/caregiver in decisions related to nutrition  Outcome: Progressing

## 2025-04-10 NOTE — UTILIZATION REVIEW
Initial Clinical Review    Admission: Date/Time/Statement: 4/9/25 1725 observation   Admission Orders (From admission, onward)       Ordered        04/09/25 1725  Place in Observation  Once                          Orders Placed This Encounter   Procedures    Place in Observation     Standing Status:   Standing     Number of Occurrences:   1     Level of Care:   Med Surg [16]     ED Arrival Information       Expected   -    Arrival   4/9/2025 13:42    Acuity   Urgent              Means of arrival   Walk-In    Escorted by   Family Member    Service   Hospitalist    Admission type   Emergency              Arrival complaint   Dizziness & Blurred Vision             Chief Complaint   Patient presents with    Dizziness     Pt reports having dizziness and blurred vision since Sunday. Was seen in er Sunday for this complaint and states that it has not gotten better. Reports symptoms are constant. Had a CT        Initial Presentation: 20 y.o. female  to ED via walk in from home.    Admitted to observation with Dx: Atypical Migraine.  Presented to ED with worsening symptoms,  seen for dizziness in ED 4/6 and persists with left eye blurriness today.   Had associated chest pressure and left arm tingling,  today with just hand tingling.  Right posterior neck pain.  Feels off balance with walking. PMHx: migraines, asthma, anxiety . On exam:  hand  are strong and symmetric, notes left decrease in sensation. lower extremities are bilateral in strength and sensation. Able to walk without obvious deficits. States feels off balance when doing Romberg. No issues with finger to nose. .   Imaging shows no acute findings on MRI brain.   ED treatment: IVF bolus, tylenol,  Migraine Cocktail of Reglan, Mg.  Given Antivert and Nurtec.    Plan includes  to start Migraine Cocktail with Decadron.  MRI brain.  .     Anticipated Length of Stay/Certification Statement: Patient will be admitted on an observation basis with an anticipated length  of stay of less than 2 midnights secondary to atypical migraine eating MRI brain and neurology consult.       Date:    Day 2:     ED Treatment-Medication Administration from 04/09/2025 1342 to 04/09/2025 1827         Date/Time Order Dose Route Action     04/09/2025 1559 sodium chloride 0.9 % bolus 1,000 mL 1,000 mL Intravenous New Bag     04/09/2025 1557 acetaminophen (TYLENOL) tablet 650 mg 650 mg Oral Given     04/09/2025 1557 metoclopramide (REGLAN) injection 10 mg 10 mg Intravenous Given     04/09/2025 1556 magnesium sulfate 2 g/50 mL IVPB (premix) 2 g 2 g Intravenous New Bag     04/09/2025 1557 meclizine (ANTIVERT) tablet 25 mg 25 mg Oral Given     04/09/2025 1640 rimegepant sulfate (NURTEC) disintegrating tablet 75 mg 75 mg Oral Given            Scheduled Medications:  diphenhydrAMINE, 25 mg, Intravenous, Q8H NINFA  metoclopramide, 10 mg, Intravenous, Q8H NINFA      Continuous IV Infusions:     PRN Meds:  acetaminophen, 650 mg, Oral, Q6H PRN  hydrOXYzine HCL, 10 mg, Oral, Q6H PRN  ondansetron, 4 mg, Intravenous, Q6H PRN      ED Triage Vitals   Temperature Pulse Respirations Blood Pressure SpO2 Pain Score   04/09/25 1349 04/09/25 1349 04/09/25 1349 04/09/25 1349 04/09/25 1349 04/09/25 1500   97.8 °F (36.6 °C) 100 16 147/70 97 % 5     Weight (last 2 days)       Date/Time Weight    04/09/25 2003 77.1 (170)            Vital Signs (last 3 days)       Date/Time Temp Pulse Resp BP MAP (mmHg) SpO2 O2 Device Patient Position - Orthostatic VS [place] Coma Scale Score Pain    04/09/25 20:28:49 97.9 °F (36.6 °C) 79 18 113/59 77 97 % None (Room air) Lying -- --    04/09/25 2005 -- -- -- -- -- -- -- -- -- 4    04/09/25 1931 -- -- -- -- -- -- -- -- 15 --    04/09/25 18:32:21 97.7 °F (36.5 °C) 96 16 155/81 106 98 % None (Room air) Sitting -- --    04/09/25 1700 -- 69 21 107/58 77 97 % None (Room air) -- -- --    04/09/25 1615 -- 64 21 115/71 88 97 % None (Room air) -- -- --    04/09/25 1600 -- 81 22 124/71 91 97 % None (Room air)  "-- -- --    04/09/25 1500 -- 79 21 120/68 -- 97 % None (Room air) -- 15 5    04/09/25 1349 97.8 °F (36.6 °C) 100 16 147/70 -- 97 % None (Room air) Sitting -- --              Pertinent Labs/Diagnostic Test Results:   Radiology:  MRI brain wo contrast   Final Interpretation by Brayan Aguilar MD (04/09 1935)      No acute intracranial abnormality.      Workstation performed: [id number]         XR chest 1 view portable   Final Interpretation by Cris Canales MD (04/09 1550)      No acute cardiopulmonary disease.            Workstation performed: [id number]           Cardiology:  ECG 12 lead    by Interface, Ris Results In (04/10 0606)      ECG 12 lead    by Interface, Ris Results In (04/09 1508)        GI:  No orders to display           Results from last 7 days   Lab Units 04/09/25  1458 04/06/25  2339   WBC Thousand/uL 9.07 11.22*   HEMOGLOBIN g/dL 14.6 14.5   HEMATOCRIT % 45.6 45.1   PLATELETS Thousands/uL 257 294   TOTAL NEUT ABS Thousands/µL 6.39 6.16         Results from last 7 days   Lab Units 04/09/25  1458 04/06/25  2339   SODIUM mmol/L 136 139   POTASSIUM mmol/L 3.8 3.7   CHLORIDE mmol/L 104 107   CO2 mmol/L 24 23   ANION GAP mmol/L 8 9   BUN mg/dL 16 13   CREATININE mg/dL 0.64 0.66   EGFR ml/min/1.73sq m 128 127   CALCIUM mg/dL 9.6 9.8   MAGNESIUM mg/dL 1.9 2.0     Results from last 7 days   Lab Units 04/09/25  1458 04/06/25  2339   AST U/L 14 16   ALT U/L 9 13   ALK PHOS U/L 80 92   TOTAL PROTEIN g/dL 7.8 8.0   ALBUMIN g/dL 4.7 4.8   TOTAL BILIRUBIN mg/dL 0.35 0.37         Results from last 7 days   Lab Units 04/09/25  1458 04/06/25  2339   GLUCOSE RANDOM mg/dL 96 89             No results found for: \"BETA-HYDROXYBUTYRATE\"                   Results from last 7 days   Lab Units 04/09/25  1458 04/06/25  2339   HS TNI 0HR ng/L <2 <2             Results from last 7 days   Lab Units 04/06/25  2339   TSH 3RD GENERATON uIU/mL 2.021                                                                     Results from last 7 " days   Lab Units 04/07/25  0032   AMPH/METH  Negative   BARBITURATE UR  Negative   BENZODIAZEPINE UR  Negative   COCAINE UR  Negative   METHADONE URINE  Negative   OPIATE UR  Negative   PCP UR  Negative   THC UR  Negative                                       Past Medical History:   Diagnosis Date    ADHD (attention deficit hyperactivity disorder)     Allergic     Anemia     Anxiety     Asthma     Clotting disorder (HCC)     Migraines     Otitis media     Urinary tract infection      Present on Admission:   Asthma   Anxiety      Admitting Diagnosis: Numbness [R20.0]  Dizziness [R42]  Blurred vision [H53.8]  Change in vision [H53.9]  Headache [R51.9]  Age/Sex: 20 y.o. female    Network Utilization Review Department  ATTENTION: Please call with any questions or concerns to [phone] and carefully listen to the prompts so that you are directed to the right person. All voicemails are confidential.   For Discharge needs, contact Care Management DC Support Team at [phone] opt. 2  Send all requests for admission clinical reviews, approved or denied determinations and any other requests to dedicated fax number below belonging to the campus where the patient is receiving treatment. List of dedicated fax numbers for the Facilities:  FACILITY NAME UR FAX NUMBER   ADMISSION DENIALS (Administrative/Medical Necessity) [phone]   DISCHARGE SUPPORT TEAM (NETWORK) [phone]   PARENT CHILD HEALTH (Maternity/NICU/Pediatrics) [phone]   [facility] [phone]   [facility] [phone]   [facility] [phone]   [facility] [phone]   [facility] [phone]   [facility] [phone]   [facility] [phone]   [facility] [phone]   [facility] [phone]    [facility] [phone]   [facility] [phone]   [facility] [phone]

## 2025-04-10 NOTE — ASSESSMENT & PLAN NOTE
Approximately 4-day history of atypical migraine symptoms with headache, pressure behind left eye with blurred vision behind left eye, left hand numbness and tingling, chest pain.  Discussed with on-call neurology and admitted for MRI brain  Start migraine cocktail with dexamethasone.  Admit for observation  MRI brain showed no acute infarct

## 2025-04-11 ENCOUNTER — TRANSITIONAL CARE MANAGEMENT (OUTPATIENT)
Dept: FAMILY MEDICINE CLINIC | Facility: HOSPITAL | Age: 20
End: 2025-04-11

## 2025-04-11 ENCOUNTER — TELEPHONE (OUTPATIENT)
Dept: FAMILY MEDICINE CLINIC | Facility: HOSPITAL | Age: 20
End: 2025-04-11

## 2025-04-11 NOTE — TELEPHONE ENCOUNTER
LM for pt to call back for TCM call.  I need to talk to pt to do the TCM.   Pts needs 1 week appt set up also for TCM with Natividad.

## 2025-04-11 NOTE — TELEPHONE ENCOUNTER
Patient called back for TCM. Tried reaching out to the office.  Please call her back to schedule.  Thank you!!

## 2025-04-15 ENCOUNTER — OFFICE VISIT (OUTPATIENT)
Age: 20
End: 2025-04-15
Payer: COMMERCIAL

## 2025-04-15 VITALS
HEART RATE: 83 BPM | BODY MASS INDEX: 30.19 KG/M2 | SYSTOLIC BLOOD PRESSURE: 98 MMHG | OXYGEN SATURATION: 98 % | DIASTOLIC BLOOD PRESSURE: 52 MMHG | HEIGHT: 64 IN | WEIGHT: 176.8 LBS

## 2025-04-15 DIAGNOSIS — G43.709 CHRONIC MIGRAINE WITHOUT AURA WITHOUT STATUS MIGRAINOSUS, NOT INTRACTABLE: Primary | ICD-10-CM

## 2025-04-15 DIAGNOSIS — G44.229 CHRONIC TENSION-TYPE HEADACHE, NOT INTRACTABLE: ICD-10-CM

## 2025-04-15 DIAGNOSIS — J39.2 THORNWALDT'S CYST: ICD-10-CM

## 2025-04-15 DIAGNOSIS — I95.1 ORTHOSTATIC HYPOTENSION: ICD-10-CM

## 2025-04-15 PROCEDURE — 99214 OFFICE O/P EST MOD 30 MIN: CPT

## 2025-04-15 RX ORDER — CEFUROXIME AXETIL 250 MG/1
0.5 TABLET ORAL AS NEEDED
Qty: 3 ML | Refills: 3 | Status: SHIPPED | OUTPATIENT
Start: 2025-04-15

## 2025-04-15 NOTE — ASSESSMENT & PLAN NOTE
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 This was present on prior imaging from 9/29/23 and is unchanged in size. She would like referral to ENT due to persistent post nasal drip and eustachian tube dysfunction with related dizziness.     Referral provided per patient request.     Orders:    Ambulatory Referral to Otolaryngology; Future

## 2025-04-15 NOTE — ASSESSMENT & PLAN NOTE
Carola is experiencing infrequent migraines but daily chronic tension type headaches. We dicussed treatment with amitriptyline vs Topiramate however given she is currently on Fluoxetine 20 mg for anxiety, we did discuss that this could be increased first prior to discussing discontinuation of Fluoxetine to add amitriptyline or the addition of Topiramate. She will discuss with her PCP tomorrow as she would prefer to increase Fluoxetine first. I have recommended she continue with her current vitamin supplementation. She will follow up in 8 weeks and if no improvement despite increase in Fluoxetine we will consider alternative options.

## 2025-04-15 NOTE — PROGRESS NOTES
Name: Carola Gustafson      : 2005      MRN: [id number]  Encounter Provider: TORIBIO Nguyen  Encounter Date: 4/15/2025   Encounter department: [facility]  :  Assessment & Plan  Chronic migraine without aura without status migrainosus, not intractable  Carola Gustafson is a 20 year old female with chronic migraine headaches without aura since adolescence, currently occurring once every other month. She has found rizatriptan to be partially effective, but not very timely in aborting her migraines. We discussed alternatives such as nasal sprays vs injections for faster onset. She would like to trial sumatriptan SC injection. She was advised no more than 12 mg/day, or 3 days per week. She was encouraged to continue her vitamin supplementation. She will follow up in 8 weeks; sooner if needed.       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 This was present on prior imaging from 23 and is unchanged in size. She would like referral to ENT due to persistent post nasal drip and eustachian tube dysfunction with related dizziness.      Referral provided per patient request.     Orders:    Ambulatory Referral to Otolaryngology; Future    Orthostatic hypotension  She also reports transient lightheadedness/dizziness specifically when going from sitting to standing quickly. She states it lasts only a split second and then resolves. Sometimes this can be accompanied by a sharp head pain.    BP trend is low-normal. Today 98/52    Discussed orthostatic hypotension and reviewed conservative management.   - compression socks  - increasing hydration  - liberalizing salt intake  - changing positions more slowly  If no  improvement, recommended follow up with PCP and/or Cardiology        Chronic tension-type headache, not intractable  Carola is experiencing infrequent migraines but daily chronic tension type headaches. We dicussed treatment with amitriptyline vs Topiramate however given she is currently on Fluoxetine 20 mg for anxiety, we did discuss that this could be increased first prior to discussing discontinuation of Fluoxetine to add amitriptyline or the addition of Topiramate. She will discuss with her PCP tomorrow as she would prefer to increase Fluoxetine first. I have recommended she continue with her current vitamin supplementation. She will follow up in 8 weeks and if no improvement despite increase in Fluoxetine we will consider alternative options.         Patient Instructions   - For Orthostatic hypotension consider:   - compression socks   - increasing hydration   - liberalizing salt intake   - changing positions more slowly   - if no improvement follow up with PCP and/or Cardiology  - For Thornwaldt cyst and nasal symptoms    - referral to ENT  - For migraines I would recommend increasing Fluoxetine    - Discuss with PCP tomorrow   - Alternatively in the future we can consider d/c of fluoxetine and starting Amitriptyline vs adding Topiramate to Fluoxetine   - Continue current vitamin supplements   - Use Sumatriptan 6 mg SC injection at the onset of a migraine headache. You may repeat the dose once in 2 hours if needed. No more than 12 mg (2 injections) in 1 day, no more than 3 times per week.  - Follow up in 8 weeks          History of Present Illness     HPI Carola Gustafson is a 20 year old female who has been followed by Neurology in the past for chronic migraine headaches, last seen 9/8/2023. She has been lost to follow up since that time. When she was last seen she was having chronic migraine headaches without aura since adolescence with recent increase in frequency and associated dizziness. She was reporting  "daily migraines at the time of her last visit. She was prescribed Emgality 240 mg x 1 month, then 120 mg monthly thereafter, as well as Rizatriptan 10 mg +/- Compazine 10 mg as needed. She completed additional work up includin2023  MARCO A  +; titer of 10 un-typeable pattern (she did see Rheumatology 24 and they advised this is considered negative)  CRP 7.0  CBC and differential- within normal limits, with exception of decreased MCH/MCHC  Vitamin D 24.5  CMP within normal limits  Lyme ab negative  Sed rate 15  TSH 1.772   Vitamin B12 423  MRI Brain wwo: Unremarkable MRI of the brain.     Unfortunately, Emgality was not covered by her insurance and they required her to try and fail 2 \"standard prophylactic therapies\". She was advised to consider Topiramate vs Amitriptyline.     She returns to the office today  after presenting to the ER 2025 for substernal upper abdominal chest pain that initially started after lunch, associated with some nausea and feeling like whenever she moves her eyes the eyes feel like they are twitching and lagging behind, possibly spinning sensation, also started having some paresthesias to the left hand and decree sensation to her left bicep. Her work up at that time was negative.    She then returned to the hospital 2025 and was admitted until 4/10/2025.  20 y.o.  female with history of asthma, anxiety and migraines who presents with a 4-day history for constellation of symptoms. Patient reported she began experiencing LUE paresthesias, L eye blurry vision, nausea/vomiting, right sided MSK neck pain, room spinning sensation with head movements and chest pressure on . She denied headache/migraine, injury/trauma to arm or neck, fevers, URI or flu like symptoms.  She was seen in the ED where workup was unremarkable.  She was given Ativan for anxiety and migraine cocktail and was ultimately discharged.      She represents to the hospital for continued dizziness, L hand " paresthesias, nausea/vomiting, left eye blurry vision and MSK neck pain.       BP on arrival 147/70.  CBC, CMP and Magnesium WNL  Prior CTA head/neck (4/6/25) unremarkable  MRI brain wo (4/9) unremarkable for acute intracranial abnormality, incidental Tarlov cyst noted     On neuro exam, she reported her symptoms are resolved aside from some nausea and some paresthesias in her left fingertips.      Plan:  - No need for further neurodiagnostic imaging at this time  - s/p IV Decadron 10 mg x 1, IV magnesium sulfate 2 g x 1, Meclizine x 1, Nurtec x 1, and IV fluid bolus  - Patient reported improvement with PO Meclizine, IV reglan 10 mg q8 hr and IV Benadryl 25 mg q8 hr               - Avoid NSAID's due to documented anaphylactic reaction  - Medical management and supportive care per primary team. Correction of any metabolic or infectious disturbances.     Attending Attestation:  20-year-old female with history of anxiety and migraine headaches who presented with constellation of left arm paresthesias, nausea emesis, dizziness/unsteadiness, and chest tightness for the past several days.  - Symptom onset around Sunday.  - Patient admits to some visual sensitivity, less to bright lights and more to a perception of oscillations or movements. (Suspect related to her vertigo symptoms)  - Left arm sensory symptoms were consistent with positive sensory symptoms i.e. paresthesias and not anesthesia.  Symptoms initially more in her hand forearm and part of bicep triceps and gradually receded to involve just her fingertips today, predominantly the second third and fourth digits (not thumb or pinky), Tinel's tap over the wrist and over the ulnar nerve the elbow did not reproduce symptoms.  - Visual symptoms are reported to be monocular and only present when covering the right eye.  - Patient reported a dull pressure/ache in the right occipital region with some associated cervicalgia.  - MRI brain negative for any contributory  findings despite ongoing symptoms.  - Family is a strong family history of migraines including mother and maternal and paternal grandmothers.     Suspect complex migraine phenomenon with migrainous vertigo.  Improved with migraine medications      She presents today and states she is having 1 migraine every other month. She does use Rizatriptan as needed and states usually if she sleeps it is effective in aborting her migraines but otherwise takes a long time to work. She reports despite this she is having daily headaches. She reports these headaches are behind her eyes. She reports pressure and pain. She states it lasts about 4 hours and occurs daily. She is taking Tylenol every day, this is not really effective. She does sometimes experience dizziness with her headaches but otherwise denies other accompanying migrainous symptoms. She reports her every day headaches are very different from her usual migraines. She reports drinking about 32 oz x 4 bottles daily. She reports sleeping on average 7-8 hours a day. 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 She does take Vitamin D, B2, and magnesium daily.     She also reports transient lightheadedness/dizziness specifically when going from sitting to standing quickly. She states it lasts only a split second and then resolves. Sometimes this can be accompanied by a sharp head pain.    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 This was present on prior imaging from 9/29/23 and is unchanged in size. She would like referral to ENT due to persistent post nasal drip and eustachian tube dysfunction with related dizziness.       Prior HPI:  She describes first developing migraine headaches in middle school. She reports her migraines have become more frequent and involve more dizziness than prior over the last 2 months which prompted her to seek neurologic evaluation today.      She does not have a headache at this  time.     Description of Headaches:  Location of pain: right-sided unilateral, temporal  Radiation of pain?:none  Character of pain:sharp  Severity of pain:7/10  Accompanying symptoms:nausea, photophobia, photopsia, diplopia, vertigo  Prodromal sx?: right eye appears tired  Rapidity of onset: can be gradual or abrupt  Typical duration of individual headache: 8 hours  Frequency of headaches: daily  Are you ever headache free?yes  Are most headaches similar in presentation? yes  Exacerbating factors: worse with exertion; no positional component   Typical precipitants: none identified      Temporal Pattern of Headaches:  Started having HA's : 12 yrs old  Worst time of day: random   Awaken from sleep?: no  Seasonal pattern?: no  'Clustering' of HA's over time? no  Overall pattern since problem began: gradually worsening     Degree of Functional Impairment: moderate; she sometimes can push through other times she has missed work due to migraines      Current Use of Meds to Treat HA:  Abortive meds? Rizatriptan 10 mg and Fioricet- did work before but more recently have not  Daily use? no  Preventive meds? none  Non-Medical/Alternative treatments for HA: no     Additional Relevant History:  History of head/neck trauma? yes - concussion at 11 yrs old  History of head/neck surgery? Yes- wisdom teeth extraction, deviated septum repair (2020), and tonsil removal  Family h/o headache problems?yes - mom, maternal and paternal grandmother, and paternal great grandfather  Family history of aneurysms? no  Exposure to carbon monoxide? no  Substance use: alcohol: none, illicit drugs: none; no medical marijuana, tobacco: none, caffeine: coffee 16 oz daily  Water: <30 oz daily  Sleep: 5-6 hours  Work: works at [employer]  Mood: Anxiety (can be controlled or uncontrolled depending on the situation). Denies depression  She lives with her parents  She is sexually active with a male partner  No plans for pregnancy at  this time; uses birth control patch     Prior Evaluation/Treatments:  Have you seen another physician for your headaches? no  Prior work-up: CT head 2018  Trigger point injections? no  Botox injections? no  Epidural injections? no  Prior PREVENTATIVE medications: none  Prior ABORTIVE mediations: acetaminophen- ineffective  Allergy to Toradol/NSAIDs- throat swelling     Review of Systems   Constitutional:  Negative for appetite change, fatigue and fever.   HENT: Negative.  Negative for hearing loss, tinnitus, trouble swallowing and voice change.    Eyes: Negative.  Negative for photophobia, pain and visual disturbance.   Respiratory: Negative.  Negative for shortness of breath.    Cardiovascular: Negative.  Negative for palpitations.   Gastrointestinal: Negative.  Negative for nausea and vomiting.   Endocrine: Negative.  Negative for cold intolerance.   Genitourinary: Negative.  Negative for dysuria, frequency and urgency.   Musculoskeletal:  Negative for back pain, gait problem, myalgias, neck pain and neck stiffness.   Skin: Negative.  Negative for rash.   Allergic/Immunologic: Negative.    Neurological:  Positive for headaches (migraines - every oyther month, headaches daily). Negative for dizziness, tremors, seizures, syncope, facial asymmetry, speech difficulty, weakness, light-headedness and numbness.   Hematological: Negative.  Does not bruise/bleed easily.   Psychiatric/Behavioral: Negative.  Negative for confusion, hallucinations and sleep disturbance.    All other systems reviewed and are negative.  I have personally reviewed the MA's review of systems and made changes as necessary.    Current Outpatient Medications on File Prior to Visit   Medication Sig Dispense Refill    albuterol (PROVENTIL HFA,VENTOLIN HFA) 90 mcg/act inhaler TAKE 2 PUFFS BY MOUTH EVERY 6 HOURS AS NEEDED FOR WHEEZE OR FOR SHORTNESS OF BREATH 8.5 g 5    Cholecalciferol (VITAMIN D3) 1,000 units tablet Take 2,000 Units by mouth daily       "diphenhydrAMINE (BENADRYL) 25 mg tablet Take 1 tablet (25 mg total) by mouth 2 (two) times a day as needed for allergies 30 tablet 0    FLUoxetine (PROzac) 20 mg capsule Take 20 mg by mouth daily      hydrOXYzine HCL (ATARAX) 10 mg tablet Take 1 tablet (10 mg total) by mouth every 6 (six) hours as needed for anxiety 30 tablet 5    metoclopramide (REGLAN) 5 mg tablet Take 1 tablet (5 mg total) by mouth 3 (three) times a day as needed (nausea) for up to 10 days 30 tablet 0    Riboflavin (B-2-400 PO) Take 400 mg by mouth in the morning      Spacer/Aero-Holding Chambers NIRU Use in the morning 1 each 0    [DISCONTINUED] rizatriptan (MAXALT) 10 mg tablet Take 1 tablet at the onset of a migraine; may repeat once in 2 hours if needed. Max 3 days per week 12 tablet 2     No current facility-administered medications on file prior to visit.         Objective   BP 98/52 (BP Location: Right arm, Patient Position: Sitting, Cuff Size: Standard)   Pulse 83   Ht 5' 4\" (1.626 m)   Wt 80.2 kg (176 lb 12.8 oz)   LMP 03/27/2025 (Approximate)   SpO2 98%   BMI 30.35 kg/m²     Neurological Exam  On neurological examination patient is alert, awake, oriented and in no distress. Speech is fluent without dysarthria or aphasia. She is able to rise easily without assistance from a seated position. Casual gait is normal including stance, stride, and arm swing.        Administrative Statements   I have spent a total time of 30 minutes in caring for this patient on the day of the visit/encounter including Diagnostic results, Prognosis, Risks and benefits of tx options, Instructions for management, Patient and family education, Importance of tx compliance, Risk factor reductions, Impressions, Counseling / Coordination of care, Documenting in the medical record, Reviewing/placing orders in the medical record (including tests, medications, and/or procedures), and Obtaining or reviewing history  .  "

## 2025-04-15 NOTE — ASSESSMENT & PLAN NOTE
Orders:    SUMAtriptan Succinate 6 MG/0.5ML SOAJ; Inject 0.5 mL (6 mg total) under the skin as needed (once at onset of migraine) May repeat 6 mg once in 2 hours if needed; max 12 mg/day, max 3 days per week

## 2025-04-15 NOTE — PATIENT INSTRUCTIONS
- For Orthostatic hypotension consider:   - compression socks   - increasing hydration   - liberalizing salt intake   - changing positions more slowly   - if no improvement follow up with PCP and/or Cardiology  - For Thornwaldt cyst and nasal symptoms    - referral to ENT  - For migraines I would recommend increasing Fluoxetine    - Discuss with PCP tomorrow   - Alternatively in the future we can consider d/c of fluoxetine and starting Amitriptyline vs adding Topiramate to Fluoxetine   - Continue current vitamin supplements   - Use Sumatriptan 6 mg SC injection at the onset of a migraine headache. You may repeat the dose once in 2 hours if needed. No more than 12 mg (2 injections) in 1 day, no more than 3 times per week.  - Follow up in 8 weeks

## 2025-04-15 NOTE — ASSESSMENT & PLAN NOTE
Carola Gustafson is a 20 year old female with chronic migraine headaches without aura since adolescence, currently occurring once every other month. She has found rizatriptan to be partially effective, but not very timely in aborting her migraines. We discussed alternatives such as nasal sprays vs injections for faster onset. She would like to trial sumatriptan SC injection. She was advised no more than 12 mg/day, or 3 days per week. She was encouraged to continue her vitamin supplementation. She will follow up in 8 weeks; sooner if needed.       Orders:    SUMAtriptan Succinate 6 MG/0.5ML SOAJ; Inject 0.5 mL (6 mg total) under the skin as needed (once at onset of migraine) May repeat 6 mg once in 2 hours if needed; max 12 mg/day, max 3 days per week

## 2025-04-15 NOTE — PROGRESS NOTES
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 This was present on prior imaging from 23 and is unchanged in size. She would like referral to ENT due to persistent post nasal drip and eustachian tube dysfunction with related dizziness.     Referral provided per patient request.     Orders:    Ambulatory Referral to Otolaryngology; Future      {Ambulatory Patient Instructions (Optional):71964}    History of Present Illness {?Quick Links Encounters * My Last Note * Last Note in Specialty * Snapshot * Since Last Visit * History :11351}  HPI   Review of Systems   Constitutional:  Negative for appetite change, fatigue and fever.   HENT: Negative.  Negative for hearing loss, tinnitus, trouble swallowing and voice change.    Eyes: Negative.  Negative for photophobia, pain and visual disturbance.   Respiratory: Negative.  Negative for shortness of breath.    Cardiovascular: Negative.  Negative for palpitations.   Gastrointestinal: Negative.  Negative for nausea and vomiting.   Endocrine: Negative.  Negative for cold intolerance.   Genitourinary: Negative.  Negative for dysuria, frequency and urgency.   Musculoskeletal:  Negative for back pain, gait problem, myalgias, neck pain and neck stiffness.   Skin: Negative.  Negative for rash.   Allergic/Immunologic: Negative.    Neurological:  Positive for headaches (migraines - every oyther month, headaches daily). Negative for dizziness, tremors, seizures, syncope, facial asymmetry, speech difficulty, weakness, light-headedness and numbness.   Hematological: Negative.  Does not bruise/bleed easily.   Psychiatric/Behavioral: Negative.  Negative for confusion, hallucinations and sleep disturbance.     All other systems reviewed and are negative.   I have personally reviewed the MA's review of systems and made changes as necessary.    {Select to insert medical history sections (Optional):21224}     Objective {?Quick Links Trend Vitals * Enter New Vitals * Results Review * Timeline (Adult) * Labs * Imaging * Cardiology * Procedures * Lung Cancer Screening * Surgical eConsent :54565}  LMP 03/27/2025 (Approximate)     Physical Exam  Neurological Exam    {Radiology Results Review (Optional):26732}    {Administrative / Billing Section (Optional):44850}

## 2025-04-15 NOTE — ASSESSMENT & PLAN NOTE
She also reports transient lightheadedness/dizziness specifically when going from sitting to standing quickly. She states it lasts only a split second and then resolves. Sometimes this can be accompanied by a sharp head pain.    BP trend is low-normal. Today 98/52    Discussed orthostatic hypotension and reviewed conservative management.   - compression socks  - increasing hydration  - liberalizing salt intake  - changing positions more slowly  If no improvement, recommended follow up with PCP and/or Cardiology

## 2025-04-16 ENCOUNTER — OFFICE VISIT (OUTPATIENT)
Dept: FAMILY MEDICINE CLINIC | Facility: HOSPITAL | Age: 20
End: 2025-04-16
Payer: COMMERCIAL

## 2025-04-16 VITALS
HEART RATE: 78 BPM | OXYGEN SATURATION: 98 % | DIASTOLIC BLOOD PRESSURE: 66 MMHG | SYSTOLIC BLOOD PRESSURE: 104 MMHG | BODY MASS INDEX: 30.38 KG/M2 | WEIGHT: 177 LBS

## 2025-04-16 DIAGNOSIS — F41.9 ANXIETY: ICD-10-CM

## 2025-04-16 DIAGNOSIS — Z86.69 HISTORY OF MIGRAINE HEADACHES: ICD-10-CM

## 2025-04-16 DIAGNOSIS — G43.809 OTHER MIGRAINE WITHOUT STATUS MIGRAINOSUS, NOT INTRACTABLE: ICD-10-CM

## 2025-04-16 DIAGNOSIS — I95.1 ORTHOSTATIC HYPOTENSION: ICD-10-CM

## 2025-04-16 DIAGNOSIS — Z76.89 ENCOUNTER FOR SUPPORT AND COORDINATION OF TRANSITION OF CARE: Primary | ICD-10-CM

## 2025-04-16 DIAGNOSIS — F42.2 MIXED OBSESSIONAL THOUGHTS AND ACTS: ICD-10-CM

## 2025-04-16 PROBLEM — H70.91 MASTOIDITIS OF RIGHT SIDE: Status: RESOLVED | Noted: 2024-06-13 | Resolved: 2025-04-16

## 2025-04-16 PROCEDURE — 99495 TRANSJ CARE MGMT MOD F2F 14D: CPT | Performed by: NURSE PRACTITIONER

## 2025-04-16 RX ORDER — ONDANSETRON 4 MG/1
4 TABLET, ORALLY DISINTEGRATING ORAL EVERY 8 HOURS PRN
Qty: 20 TABLET | Refills: 0 | Status: SHIPPED | OUTPATIENT
Start: 2025-04-16

## 2025-04-16 NOTE — ASSESSMENT & PLAN NOTE
Ongoing lightheadedness dizziness reported; feels it is related to her circadian rhythm disturbance with shift work and currently going to school.  B12 slightly elevated in the fall 2024 thus she decreased her dose.  Orthostatics completed in the office as below unremarkable.  Discussed optimizing hydration nutrition with verbalized understanding.    Laying: Heart rate 78 with blood pressure 102/60 pulse ox 99% reports lightheadedness  Sitting: Heart rate 96 with blood pressure 120/80 with pulse ox 96% reports lightheadedness  Standing: Heart rate 74 with blood pressure 118/80 with pulse ox 96 reports lightheadedness

## 2025-04-16 NOTE — PATIENT INSTRUCTIONS
Increase fluoxetine to 40mg daily.   Don't use hydroxyzine if using benadryl as needed.   Use zofran as needed for nausea associated with migraine.

## 2025-04-16 NOTE — ASSESSMENT & PLAN NOTE
History of anxiety with OCD.  Continues with weekly therapy and medication adherence to fluoxetine 20 mg p.o. daily.  Per discussion with neurology yesterday she would like to increase her fluoxetine to 40 mg p.o. daily due to ongoing symptoms of anxiety with OCD prior to considering transition to amitriptyline or topiramate for migraine prophylaxis.  Reports rare use of hydroxyzine.  Advised not to use Benadryl (part of her abortive migraine regimen) and hydroxyzine thus will discontinue hydroxyzine at this time.  She will follow-up in office within the next 2 to 3-month she will continue therapeutic services as an outpatient.  She will work on lifestyle interventions to decrease stress including dietary choices, physical activity as well as a restorative sleep pattern as she does note schoolwork as well as circadian rhythm disturbance due to shift work likely contributing.  Orders:    FLUoxetine (PROzac) 20 mg capsule; Take 2 capsules (40 mg total) by mouth daily

## 2025-04-16 NOTE — ASSESSMENT & PLAN NOTE
Recent hospitalization from 4/9/2025 to 4/10/2025 with atypical migraine symptoms including tension-like headache with blurred vision left hand numbness tingling.  History of migraines without aura since adolescence typically occurring once every other month.  Improved with migraine cocktail/dexamethasone.  MRI reassuring however as a dental finding of known thornwaldt cyst unchanged since 2023 imaging.  She has been referred to ENT for further evaluation/treatment PND with ENT tube dysfunction.  - Follow-up with neurology yesterday and was initiated on sumatriptan subcu injection as part of her abortive therapy.  After a thorough discussion Page decided to increase her fluoxetine to 40 mg p.o. daily vs initiating amitriptyline or topiramate.  She is ordered hydroxyzine for her anxiety with panic attacks-reports rare use.  Will discontinue hydroxyzine as she is using Benadryl as needed for abortive therapy of her migraines.  She stopped using the Reglan 2 days ago -did discuss that this could also be contributing to dizziness with verbalized understanding.  She is UTD with most recent eye exam 2 weeks ago and wears her contacts-denies eyestrain (considering in school and having to do studying/computer work)  - She will continue her B12, B6 and magnesium supplementation.    Orders:    ondansetron (ZOFRAN-ODT) 4 mg disintegrating tablet; Take 1 tablet (4 mg total) by mouth every 8 (eight) hours as needed for nausea or vomiting

## 2025-04-16 NOTE — PROGRESS NOTES
Transition of Care Visit:  Name: Carola Gustafson      : 2005      MRN: [id number]  Encounter Provider: TORIBIO Mendieta  Encounter Date: 2025   Encounter department: [facility] [address]    Assessment & Plan  Encounter for support and coordination of transition of care         Other migraine without status migrainosus, not intractable  Recent hospitalization from 2025 to 4/10/2025 with atypical migraine symptoms including tension-like headache with blurred vision left hand numbness tingling.  History of migraines without aura since adolescence typically occurring once every other month.  Improved with migraine cocktail/dexamethasone.  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  She has been referred to ENT for further evaluation/treatment PND with ENT tube dysfunction.  - Follow-up with neurology yesterday and was initiated on sumatriptan subcu injection as part of her abortive therapy.  After a thorough discussion Page decided to increase her fluoxetine to 40 mg p.o. daily vs initiating amitriptyline or topiramate.  She is ordered hydroxyzine for her anxiety with panic attacks-reports rare use.  Will discontinue hydroxyzine as she is using Benadryl as needed for abortive therapy of her migraines.  She stopped using the Reglan 2 days ago -did discuss that this could also be contributing to dizziness with verbalized understanding.  She is UTD with most recent eye exam 2 weeks ago and wears her contacts-denies eyestrain (considering in school and having to do studying/computer work)  - She will continue her B12, B6 and magnesium supplementation.    Orders:    ondansetron (ZOFRAN-ODT) 4 mg disintegrating tablet; Take 1 tablet (4 mg total) by mouth every 8 (eight) hours as needed for nausea or vomiting    History of migraine headaches         Anxiety  History of anxiety with OCD.  Continues with weekly therapy and medication  adherence to fluoxetine 20 mg p.o. daily.  Per discussion with neurology yesterday she would like to increase her fluoxetine to 40 mg p.o. daily due to ongoing symptoms of anxiety with OCD prior to considering transition to amitriptyline or topiramate for migraine prophylaxis.  Reports rare use of hydroxyzine.  Advised not to use Benadryl (part of her abortive migraine regimen) and hydroxyzine thus will discontinue hydroxyzine at this time.  She will follow-up in office within the next 2 to 3-month she will continue therapeutic services as an outpatient.  She will work on lifestyle interventions to decrease stress including dietary choices, physical activity as well as a restorative sleep pattern as she does note schoolwork as well as circadian rhythm disturbance due to shift work likely contributing.  Orders:    FLUoxetine (PROzac) 20 mg capsule; Take 2 capsules (40 mg total) by mouth daily    Mixed obsessional thoughts and acts    Orders:    FLUoxetine (PROzac) 20 mg capsule; Take 2 capsules (40 mg total) by mouth daily    Orthostatic hypotension  Ongoing lightheadedness dizziness reported; feels it is related to her circadian rhythm disturbance with shift work and currently going to school.  B12 slightly elevated in the fall 2024 thus she decreased her dose.  Orthostatics completed in the office as below unremarkable.  Discussed optimizing hydration nutrition with verbalized understanding.    Laying: Heart rate 78 with blood pressure 102/60 pulse ox 99% reports lightheadedness  Sitting: Heart rate 96 with blood pressure 120/80 with pulse ox 96% reports lightheadedness  Standing: Heart rate 74 with blood pressure 118/80 with pulse ox 96 reports lightheadedness            History of Present Illness     Transitional Care Management Review:   Carola Gustafson is a 20 y.o. female here for TCM follow up.     During the TCM phone call patient stated:  TCM Call (since 4/2/2025)       Date and time call was made  4/11/2025   1:25 PM    Patient was hospitialized at  [facility]    Date of Admission  04/09/25    Date of discharge  04/10/25    Diagnosis  Migraine    Disposition  Home    Were the patients medications reviewed and updated  Yes    Current Symptoms  None          TCM Call (since 4/2/2025)       Scheduled for follow up?  Yes    Did you obtain your prescribed medications  Yes    Do you need help managing your prescriptions or medications  No    Is transportation to your appointment needed  No    I have advised the patient to call PCP with any new or worsening symptoms  Alize Verdin MA          Presents for TCM with phone call completed 4/11/25    Ongoing dizziness in general however was taking Reglan on a consistent basis until 2 days ago.  No further migraines.    Advised to increase fluoxetine  Rare use of hydroxyzine.  Given benadryl prn as part of migraine cocktail abortive therapy but hasn't used it.         Review of Systems   Constitutional: Negative.  Negative for activity change, appetite change, chills, fatigue and fever.   HENT:  Positive for postnasal drip. Negative for congestion, ear pain, sinus pain and trouble swallowing.    Eyes: Negative.    Respiratory: Negative.  Negative for cough, chest tightness, shortness of breath and wheezing.    Cardiovascular: Negative.  Negative for chest pain, palpitations and leg swelling.   Gastrointestinal: Negative.  Negative for abdominal pain, constipation and diarrhea.   Endocrine: Negative.    Genitourinary: Negative.  Negative for difficulty urinating and dysuria.   Musculoskeletal: Negative.  Negative for gait problem.   Skin: Negative.    Allergic/Immunologic: Negative.  Negative for immunocompromised state.   Neurological:  Positive for light-headedness and headaches. Negative for dizziness.        No further migraines since hospitalization   Hematological: Negative.    Psychiatric/Behavioral:  Positive for sleep disturbance. The patient is nervous/anxious.          Due to shift work     Objective   /66   Pulse 78   Wt 80.3 kg (177 lb)   LMP 03/27/2025 (Approximate)   SpO2 98%   BMI 30.38 kg/m²     Physical Exam  Vitals and nursing note reviewed.   Constitutional:       General: She is not in acute distress.     Appearance: She is well-developed. She is obese.   HENT:      Head: Normocephalic and atraumatic.   Eyes:      Conjunctiva/sclera: Conjunctivae normal.   Cardiovascular:      Rate and Rhythm: Normal rate and regular rhythm.      Heart sounds: No murmur heard.  Pulmonary:      Effort: Pulmonary effort is normal. No respiratory distress.      Breath sounds: Normal breath sounds.   Abdominal:      Palpations: Abdomen is soft.      Tenderness: There is no abdominal tenderness.   Musculoskeletal:         General: No swelling.      Cervical back: Neck supple.      Right lower leg: No edema.      Left lower leg: No edema.   Skin:     General: Skin is warm and dry.      Capillary Refill: Capillary refill takes less than 2 seconds.      Coloration: Skin is pale.   Neurological:      Mental Status: She is alert and oriented to person, place, and time.      GCS: GCS eye subscore is 4. GCS verbal subscore is 5. GCS motor subscore is 6.      Cranial Nerves: Cranial nerves 2-12 are intact.      Motor: Motor function is intact.      Coordination: Coordination is intact.      Gait: Gait is intact.   Psychiatric:         Mood and Affect: Mood normal.         Speech: Speech normal.         Behavior: Behavior normal. Behavior is cooperative.         Thought Content: Thought content normal.         Cognition and Memory: Cognition and memory normal.         Judgment: Judgment normal.       Medications have been reviewed by provider in current encounter

## 2025-06-06 ENCOUNTER — TELEPHONE (OUTPATIENT)
Dept: NEUROLOGY | Facility: CLINIC | Age: 20
End: 2025-06-06

## 2025-06-06 NOTE — TELEPHONE ENCOUNTER
LMOM to confirm patient's appointment on 6/11  with [facility]. Attempted to confirm time, date, location.

## 2025-06-16 ENCOUNTER — OFFICE VISIT (OUTPATIENT)
Dept: FAMILY MEDICINE CLINIC | Facility: HOSPITAL | Age: 20
End: 2025-06-16
Payer: COMMERCIAL

## 2025-06-16 VITALS
DIASTOLIC BLOOD PRESSURE: 70 MMHG | WEIGHT: 176 LBS | HEART RATE: 60 BPM | BODY MASS INDEX: 30.05 KG/M2 | HEIGHT: 64 IN | TEMPERATURE: 97.7 F | SYSTOLIC BLOOD PRESSURE: 108 MMHG | OXYGEN SATURATION: 99 %

## 2025-06-16 DIAGNOSIS — H66.001 NON-RECURRENT ACUTE SUPPURATIVE OTITIS MEDIA OF RIGHT EAR WITHOUT SPONTANEOUS RUPTURE OF TYMPANIC MEMBRANE: ICD-10-CM

## 2025-06-16 DIAGNOSIS — F42.2 MIXED OBSESSIONAL THOUGHTS AND ACTS: ICD-10-CM

## 2025-06-16 DIAGNOSIS — F41.9 ANXIETY: Primary | ICD-10-CM

## 2025-06-16 PROBLEM — H00.011 HORDEOLUM EXTERNUM OF RIGHT UPPER EYELID: Status: RESOLVED | Noted: 2024-12-17 | Resolved: 2025-06-16

## 2025-06-16 PROCEDURE — 99214 OFFICE O/P EST MOD 30 MIN: CPT | Performed by: NURSE PRACTITIONER

## 2025-06-16 RX ORDER — AMOXICILLIN 875 MG/1
875 TABLET, COATED ORAL 2 TIMES DAILY
Qty: 14 TABLET | Refills: 0 | Status: SHIPPED | OUTPATIENT
Start: 2025-06-16 | End: 2025-06-23

## 2025-06-16 NOTE — ASSESSMENT & PLAN NOTE
History of anxiety with OCD with KAL score 15.  Reports stopping fluoxetine after another specialist told her it would not be effective in treating her migraines.  No longer seeing therapist due to insurance change.  Tearful in office and frustrated.  Would like to go back on fluoxetine as she felt it was effective in treating her symptoms.  Verbalized understanding that she medication alone will not treat her symptoms and she needs to reestablish with her therapist.  Discussed checking insurance as well as checking [email or web address] for therapeutic options.  -Restart fluoxetine 20 mg p.o. daily   - Reestablish with therapist.  -She will follow-up in 1 month.

## 2025-06-16 NOTE — ASSESSMENT & PLAN NOTE
Bilateral ear pain for the last few days  R>L and now with sore throat.  Some nasal congestion and sinus pressure.  Did have a cough but that has since resolved.  No fever/chills/change in appetite.  No tylenol use lately but has been using to manage symptoms.  Not currently taking allergy medication for upcoming allergy testing.  Reports ENT told her to stop fluoxetine.   - NAD, VSS.  Right TM erythemic with diminished mobility.  Mild mastoid tenderness.  Some nasal congestion with right-sided maxillary tenderness.  No cervical adenopathy present.  + PND.  Lungs CTA without cough.  Euvolemic with moist mucous membranes.  - Will treat for right ear infection with course of amoxicillin.  Optimize hydration nutrition and consider use of Flonase.

## 2025-06-16 NOTE — PROGRESS NOTES
[facility]   Kayli ROONEY    Assessment/Plan:   1. Anxiety  Assessment & Plan:  History of anxiety with OCD with KAL score 15.  Reports stopping fluoxetine after another specialist told her it would not be effective in treating her migraines.  No longer seeing therapist due to insurance change.  Tearful in office and frustrated.  Would like to go back on fluoxetine as she felt it was effective in treating her symptoms.  Verbalized understanding that she medication alone will not treat her symptoms and she needs to reestablish with her therapist.  Discussed checking insurance as well as checking [facility] for therapeutic options.  -Restart fluoxetine 20 mg p.o. daily   - Reestablish with therapist.  -She will follow-up in 1 month.  Orders:  -     FLUoxetine (PROzac) 20 mg capsule; Take 1 capsule (20 mg total) by mouth daily  2. Mixed obsessional thoughts and acts  -     FLUoxetine (PROzac) 20 mg capsule; Take 1 capsule (20 mg total) by mouth daily  3. Non-recurrent acute suppurative otitis media of right ear without spontaneous rupture of tympanic membrane  Assessment & Plan:  Bilateral ear pain for the last few days  R>L and now with sore throat.  Some nasal congestion and sinus pressure.  Did have a cough but that has since resolved.  No fever/chills/change in appetite.  No tylenol use lately but has been using to manage symptoms.  Not currently taking allergy medication for upcoming allergy testing.  Reports ENT told her to stop fluoxetine.   - NAD, VSS.  Right TM erythemic with diminished mobility.  Mild mastoid tenderness.  Some nasal congestion with right-sided maxillary tenderness.  No cervical adenopathy present.  + PND.  Lungs CTA without cough.  Euvolemic with moist mucous membranes.  - Will treat for right ear infection with course of amoxicillin.  Optimize hydration nutrition and consider use of Flonase.    Orders:  -     amoxicillin (AMOXIL) 875 mg tablet; Take 1 tablet (875  mg total) by mouth 2 (two) times a day for 7 days      Return in about 4 weeks (around 7/14/2025) for Recheck.  Patient may call or return to office with any questions or concerns.     ______________________________________________________________________  Subjective:     Patient ID: Carola Gustafson is a 20 y.o. female.  Carola Gustafson  Chief Complaint   Patient presents with    Follow-up     2 month      Per A/P             The following portions of the patient's history were reviewed and updated as appropriate: allergies, current medications, past family history, past medical history, past social history, past surgical history, and problem list.    Review of Systems   Constitutional: Negative.  Negative for activity change, appetite change, chills, fatigue and fever.   HENT:  Positive for congestion, ear pain, postnasal drip, sinus pressure and sore throat. Negative for rhinorrhea and sinus pain.    Eyes: Negative.  Negative for itching.   Respiratory:  Positive for cough. Negative for shortness of breath.         Cough x1 day since resolved.     Cardiovascular: Negative.  Negative for chest pain and leg swelling.   Gastrointestinal: Negative.  Negative for constipation and diarrhea.   Endocrine: Negative.    Genitourinary: Negative.  Negative for dysuria.   Musculoskeletal: Negative.    Skin: Negative.    Allergic/Immunologic: Negative.  Negative for immunocompromised state.   Neurological: Negative.  Negative for dizziness and light-headedness.   Hematological: Negative.    Psychiatric/Behavioral:  Negative for sleep disturbance. The patient is nervous/anxious.          Objective:      Vitals:    06/16/25 0751   BP: 108/70   Pulse: 60   Temp: 97.7 °F (36.5 °C)   SpO2: 99%      Physical Exam  Vitals and nursing note reviewed.   Constitutional:       Appearance: Normal appearance. She is obese.   HENT:      Head: Normocephalic and atraumatic.      Right Ear: Ear canal and external ear normal. Tenderness present.  "Tympanic membrane is erythematous. Tympanic membrane has decreased mobility.      Left Ear: Tympanic membrane, ear canal and external ear normal.      Nose: Congestion present.      Right Sinus: Maxillary sinus tenderness present.      Mouth/Throat:      Mouth: Mucous membranes are moist.      Pharynx: Oropharynx is clear. Postnasal drip present.     Eyes:      Extraocular Movements: Extraocular movements intact.      Conjunctiva/sclera: Conjunctivae normal.      Pupils: Pupils are equal, round, and reactive to light.       Cardiovascular:      Rate and Rhythm: Normal rate and regular rhythm.      Pulses: Normal pulses.      Heart sounds: Normal heart sounds.   Pulmonary:      Effort: Pulmonary effort is normal.      Breath sounds: Normal breath sounds.   Abdominal:      General: Bowel sounds are normal.      Palpations: Abdomen is soft.     Musculoskeletal:         General: Normal range of motion.      Cervical back: Normal range of motion and neck supple.   Lymphadenopathy:      Cervical: No cervical adenopathy.     Skin:     General: Skin is warm and dry.     Neurological:      General: No focal deficit present.      Mental Status: She is alert and oriented to person, place, and time.     Psychiatric:         Mood and Affect: Mood is anxious and depressed. Affect is tearful.         Behavior: Behavior normal.         Thought Content: Thought content normal.         Judgment: Judgment normal.           Portions of the record may have been created with voice recognition software. Occasional wrong word or \"sound alike\" substitutions may have occurred due to the inherent limitations of voice recognition software. Please review the chart carefully and recognize, using context, where substitutions/typographical errors may have occurred.       "

## 2025-06-16 NOTE — PATIENT INSTRUCTIONS
Restart fluoxetine 20mg daily for the next month.   Restart with therapist please!    Amoxicillin as directed for ear infection.

## 2025-06-18 ENCOUNTER — APPOINTMENT (OUTPATIENT)
Dept: LAB | Facility: HOSPITAL | Age: 20
End: 2025-06-18
Payer: COMMERCIAL

## 2025-06-18 DIAGNOSIS — D84.9 IMMUNODEFICIENCY, UNSPECIFIED (HCC): ICD-10-CM

## 2025-06-18 LAB
BASOPHILS # BLD AUTO: 0.06 THOUSANDS/ÂΜL (ref 0–0.1)
BASOPHILS NFR BLD AUTO: 1 % (ref 0–1)
C3 SERPL-MCNC: 166 MG/DL (ref 87–200)
C4 SERPL-MCNC: 32 MG/DL (ref 19–52)
EOSINOPHIL # BLD AUTO: 0.17 THOUSAND/ÂΜL (ref 0–0.61)
EOSINOPHIL NFR BLD AUTO: 2 % (ref 0–6)
ERYTHROCYTE [DISTWIDTH] IN BLOOD BY AUTOMATED COUNT: 12.6 % (ref 11.6–15.1)
HCT VFR BLD AUTO: 38.8 % (ref 34.8–46.1)
HGB BLD-MCNC: 12.4 G/DL (ref 11.5–15.4)
IGA SERPL-MCNC: 180 MG/DL (ref 66–433)
IGG SERPL-MCNC: 973 MG/DL (ref 635–1741)
IGM SERPL-MCNC: 123 MG/DL (ref 45–281)
IMM GRANULOCYTES # BLD AUTO: 0.05 THOUSAND/UL (ref 0–0.2)
IMM GRANULOCYTES NFR BLD AUTO: 1 % (ref 0–2)
LYMPHOCYTES # BLD AUTO: 4.07 THOUSANDS/ÂΜL (ref 0.6–4.47)
LYMPHOCYTES NFR BLD AUTO: 41 % (ref 14–44)
MCH RBC QN AUTO: 28.3 PG (ref 26.8–34.3)
MCHC RBC AUTO-ENTMCNC: 32 G/DL (ref 31.4–37.4)
MCV RBC AUTO: 89 FL (ref 82–98)
MONOCYTES # BLD AUTO: 0.52 THOUSAND/ÂΜL (ref 0.17–1.22)
MONOCYTES NFR BLD AUTO: 5 % (ref 4–12)
NEUTROPHILS # BLD AUTO: 5.17 THOUSANDS/ÂΜL (ref 1.85–7.62)
NEUTS SEG NFR BLD AUTO: 50 % (ref 43–75)
NRBC BLD AUTO-RTO: 0 /100 WBCS
PLATELET # BLD AUTO: 281 THOUSANDS/UL (ref 149–390)
PMV BLD AUTO: 9.8 FL (ref 8.9–12.7)
RBC # BLD AUTO: 4.38 MILLION/UL (ref 3.81–5.12)
VZV IGG SER QL IA: 1.59 S/CO
VZV IGG SER QL IA: POSITIVE
WBC # BLD AUTO: 10.04 THOUSAND/UL (ref 4.31–10.16)

## 2025-06-18 PROCEDURE — 86787 VARICELLA-ZOSTER ANTIBODY: CPT

## 2025-06-18 PROCEDURE — 82784 ASSAY IGA/IGD/IGG/IGM EACH: CPT

## 2025-06-18 PROCEDURE — 86317 IMMUNOASSAY INFECTIOUS AGENT: CPT

## 2025-06-18 PROCEDURE — 85025 COMPLETE CBC W/AUTO DIFF WBC: CPT

## 2025-06-18 PROCEDURE — 86581 STRPTCS PNEUM ANTB SEROT IA: CPT

## 2025-06-18 PROCEDURE — 86355 B CELLS TOTAL COUNT: CPT

## 2025-06-18 PROCEDURE — 86360 T CELL ABSOLUTE COUNT/RATIO: CPT

## 2025-06-18 PROCEDURE — 86160 COMPLEMENT ANTIGEN: CPT

## 2025-06-18 PROCEDURE — 36415 COLL VENOUS BLD VENIPUNCTURE: CPT

## 2025-06-18 PROCEDURE — 86359 T CELLS TOTAL COUNT: CPT

## 2025-06-18 PROCEDURE — 82787 IGG 1 2 3 OR 4 EACH: CPT

## 2025-06-18 PROCEDURE — 83520 IMMUNOASSAY QUANT NOS NONAB: CPT

## 2025-06-19 LAB
BASOPHILS # BLD AUTO: 0.1 X10E3/UL (ref 0–0.2)
BASOPHILS NFR BLD AUTO: 1 %
CD19 CELLS # BLD: 636 /UL (ref 12–645)
CD19 CELLS NFR BLD: 15.5 % (ref 3.3–25.4)
CD3 CELLS # BLD: 3329 /UL (ref 622–2402)
CD3 CELLS NFR BLD: 81.2 % (ref 57.5–86.2)
CD3+CD4+ CELLS # BLD: 1984 /UL (ref 359–1519)
CD3+CD4+ CELLS NFR BLD: 48.4 % (ref 30.8–58.5)
CD3+CD4+ CELLS/CD3+CD8+ CLL BLD: 1.65 % (ref 0.92–3.72)
CD3+CD8+ CELLS # BLD: 1205 /UL (ref 109–897)
CD3+CD8+ CELLS NFR BLD: 29.4 % (ref 12–35.5)
EOSINOPHIL # BLD AUTO: 0.2 X10E3/UL (ref 0–0.4)
EOSINOPHIL NFR BLD AUTO: 2 %
ERYTHROCYTE [DISTWIDTH] IN BLOOD BY AUTOMATED COUNT: 14.8 % (ref 11.7–15.4)
HCT VFR BLD AUTO: 39.1 % (ref 34–46.6)
HGB BLD-MCNC: 12.5 G/DL (ref 11.1–15.9)
IMM GRANULOCYTES # BLD: 0 X10E3/UL (ref 0–0.1)
IMM GRANULOCYTES NFR BLD: 0 %
LYMPHOCYTES # BLD AUTO: 4.1 X10E3/UL (ref 0.7–3.1)
LYMPHOCYTES NFR BLD AUTO: 40 %
MCH RBC QN AUTO: 29 PG (ref 26.6–33)
MCHC RBC AUTO-ENTMCNC: 32 G/DL (ref 31.5–35.7)
MCV RBC AUTO: 91 FL (ref 79–97)
MONOCYTES # BLD AUTO: 0.6 X10E3/UL (ref 0.1–0.9)
MONOCYTES NFR BLD AUTO: 6 %
NEUTROPHILS # BLD AUTO: 5.4 X10E3/UL (ref 1.4–7)
NEUTROPHILS NFR BLD AUTO: 51 %
PLATELET # BLD AUTO: 282 X10E3/UL (ref 150–450)
RBC # BLD AUTO: 4.31 X10E6/UL (ref 3.77–5.28)
WBC # BLD AUTO: 10.3 X10E3/UL (ref 3.4–10.8)

## 2025-06-20 LAB — MANNOSE-BP SER-MCNC: 842 NG/ML

## 2025-06-21 LAB
C TETANI IGG SER IA-ACNC: 1.63 IU/ML
DEPRECATED S PNEUM14 IGG SER-MCNC: 3.6 UG/ML
DEPRECATED S PNEUM14 IGG SER-MCNC: 4.6 UG/ML
DEPRECATED S PNEUM19 IGG SER-MCNC: 0.4 UG/ML
DEPRECATED S PNEUM19 IGG SER-MCNC: 0.5 UG/ML
DEPRECATED S PNEUM23 IGG SER-MCNC: 0.6 UG/ML
DEPRECATED S PNEUM23 IGG SER-MCNC: 0.9 UG/ML
DEPRECATED S PNEUM3 IGG SER-MCNC: <0.1 UG/ML
DEPRECATED S PNEUM3 IGG SER-MCNC: <0.1 UG/ML
DEPRECATED S PNEUM4 IGG SER-MCNC: 0.2 UG/ML
DEPRECATED S PNEUM4 IGG SER-MCNC: 0.2 UG/ML
DEPRECATED S PNEUM8 AB SER-MCNC: <0.3 UG/ML
DEPRECATED S PNEUM8 AB SER-MCNC: <0.3 UG/ML
DEPRECATED S PNEUM9 IGG SER-MCNC: <0.1 UG/ML
DEPRECATED S PNEUM9 IGG SER-MCNC: <0.1 UG/ML
FLUBV RNA SPEC QL NAA+PROBE: 0.1 UG/ML
FLUBV RNA SPEC QL NAA+PROBE: 0.2 UG/ML
S PNEUM DA 18C IGG SER-MCNC: 0.1 UG/ML
S PNEUM DA 18C IGG SER-MCNC: 0.1 UG/ML
S PNEUM DA 19A IGG SER-MCNC: 0.8 UG/ML
S PNEUM DA 19A IGG SER-MCNC: 0.9 UG/ML
S PNEUM DA 6B IGG SER-MCNC: <0.1 UG/ML
S PNEUM DA 6B IGG SER-MCNC: <0.1 UG/ML
SL AMB PNEUMO AB TYPE 17 (17F): 0.5 UG/ML
SL AMB PNEUMO AB TYPE 20: 0.2 UG/ML
SL AMB PNEUMO AB TYPE 22 (22F): <0.1 UG/ML
SL AMB PNEUMO AB TYPE 2: <0.2 UG/ML
SL AMB PNEUMO AB TYPE 34 (10A): 0.2 UG/ML
SL AMB PNEUMO AB TYPE 43 (11A): 0.5 UG/ML
SL AMB PNEUMO AB TYPE 54 (15B): 1.3 UG/ML
SL AMB PNEUMO AB TYPE 5: <0.1 UG/ML
SL AMB PNEUMO AB TYPE 70 (33F): <0.1 UG/ML
STREP PNEUMO TYPE 1: <0.1 UG/ML
STREP PNEUMO TYPE 1: <0.1 UG/ML
STREP PNEUMO TYPE 51: 1.4 UG/ML
STREP PNEUMO TYPE 51: 1.7 UG/ML
STREP PNEUMO TYPE 68: <0.1 UG/ML
STREP PNEUMO TYPE 68: <0.1 UG/ML

## 2025-06-25 LAB
IGG SERPL-MCNC: 996 MG/DL (ref 586–1602)
IGG1 SER-MCNC: 470 MG/DL (ref 248–810)
IGG2 SER-MCNC: 289 MG/DL (ref 130–555)
IGG3 SER-MCNC: 90 MG/DL (ref 15–102)
IGG4 SER-MCNC: 39 MG/DL (ref 2–96)

## 2025-06-26 ENCOUNTER — OFFICE VISIT (OUTPATIENT)
Dept: URGENT CARE | Facility: CLINIC | Age: 20
End: 2025-06-26
Payer: COMMERCIAL

## 2025-06-26 VITALS
SYSTOLIC BLOOD PRESSURE: 114 MMHG | TEMPERATURE: 99 F | RESPIRATION RATE: 16 BRPM | HEART RATE: 76 BPM | DIASTOLIC BLOOD PRESSURE: 64 MMHG | OXYGEN SATURATION: 97 %

## 2025-06-26 DIAGNOSIS — J02.9 SORE THROAT: Primary | ICD-10-CM

## 2025-06-26 PROCEDURE — 87880 STREP A ASSAY W/OPTIC: CPT | Performed by: PREVENTIVE MEDICINE

## 2025-06-26 PROCEDURE — S9083 URGENT CARE CENTER GLOBAL: HCPCS | Performed by: PREVENTIVE MEDICINE

## 2025-06-26 PROCEDURE — G0382 LEV 3 HOSP TYPE B ED VISIT: HCPCS | Performed by: PREVENTIVE MEDICINE

## 2025-06-26 RX ORDER — METHYLPREDNISOLONE 4 MG/1
TABLET ORAL
Qty: 1 EACH | Refills: 0 | Status: SHIPPED | OUTPATIENT
Start: 2025-06-26

## 2025-06-26 NOTE — PATIENT INSTRUCTIONS
You can try using a decongestant daily such as Zyrtec or Allegra.  Take the prednisone as directed

## 2025-06-26 NOTE — LETTER
June 26, 2025     Patient: Carola Gustafson   YOB: 2005   Date of Visit: 6/26/2025       To Whom it May Concern:    Carola Gustafson was seen in my clinic on 6/26/2025. She may return to school on 06/30.    If you have any questions or concerns, please don't hesitate to call.         Sincerely,          Patricio Lebron MD        CC: No Recipients

## 2025-06-26 NOTE — PROGRESS NOTES
[facility]        NAME: Carola Gustafson is a 20 y.o. female  : 2005    MRN: [id number]  DATE: 2025  TIME: 3:09 PM    Assessment and Plan   Sore throat [J02.9]  1. Sore throat  POCT rapid ANTIGEN strepA            Patient Instructions       Follow up with PCP in 3-5 days.  Proceed to  ER if symptoms worsen.    If tests have been performed at [facility], our office will contact you with results if changes need to be made to the care plan discussed with you at the visit.  [facility]    Chief Complaint     Chief Complaint   Patient presents with    Sore Throat     Patient with sore throat x4 days. Patient went to her PCP 1.5 weeks ago, got abx for b/l ear infection, which she states didn't help. Patient states this AM, her R ear popped and she had pain radiate down her face and jaw.          History of Present Illness       The right ear felt like it popped and after that there was some pain into the jaw area.  Denies postnasal drip denies cough denies fever    Sore Throat   Associated symptoms include ear pain.       Review of Systems   Review of Systems   HENT:  Positive for ear pain and sore throat.          Current Medications     Current Medications[1]    Current Allergies     Allergies as of 2025 - Reviewed 2025   Allergen Reaction Noted    Ketorolac Hives 2018    Azithromycin  2017    Ketorocaine-l Tongue Swelling 2018    Ketorolac tromethamine Throat Swelling and Tongue Swelling 02/15/2018    Latex Hives and Blisters 2016    Medical tape Blisters 12/10/2016    Montelukast Hives and Other (See Comments) 2008    Nsaids Throat Swelling 2019    Other Other (See Comments) 2014    Sulfamethoxazole-trimethoprim GI Intolerance 2014            The following portions of the patient's history were reviewed and updated as appropriate: allergies, current medications, past family history, past medical  history, past social history, past surgical history and problem list.     Past Medical History[2]    Past Surgical History[3]    Family History[4]      Medications have been verified.        Objective   /64   Pulse 76   Temp 99 °F (37.2 °C)   Resp 16   LMP 06/17/2025 (Approximate)   SpO2 97%   Patient's last menstrual period was 06/17/2025 (approximate).       Physical Exam     Physical Exam  HENT:      Right Ear: Tympanic membrane and ear canal normal.      Left Ear: Tympanic membrane and ear canal normal.      Mouth/Throat:      Mouth: Mucous membranes are moist. No oral lesions.      Pharynx: Oropharynx is clear. No pharyngeal swelling, oropharyngeal exudate, posterior oropharyngeal erythema or uvula swelling.   Lymphadenopathy:      Cervical: No cervical adenopathy.     Rapid strep negative                   [1]   Current Outpatient Medications:     albuterol (PROVENTIL HFA,VENTOLIN HFA) 90 mcg/act inhaler, TAKE 2 PUFFS BY MOUTH EVERY 6 HOURS AS NEEDED FOR WHEEZE OR FOR SHORTNESS OF BREATH, Disp: 8.5 g, Rfl: 5    Cholecalciferol (VITAMIN D3) 1,000 units tablet, Take 2,000 Units by mouth in the morning., Disp: , Rfl:     diphenhydrAMINE (BENADRYL) 25 mg tablet, Take 1 tablet (25 mg total) by mouth 2 (two) times a day as needed for allergies, Disp: 30 tablet, Rfl: 0    FLUoxetine (PROzac) 20 mg capsule, Take 1 capsule (20 mg total) by mouth daily, Disp: 30 capsule, Rfl: 0    ondansetron (ZOFRAN-ODT) 4 mg disintegrating tablet, Take 1 tablet (4 mg total) by mouth every 8 (eight) hours as needed for nausea or vomiting, Disp: 20 tablet, Rfl: 0    Riboflavin (B-2-400 PO), Take 400 mg by mouth in the morning, Disp: , Rfl:     Spacer/Aero-Holding Chambers NIRU, Use in the morning (Patient not taking: Reported on 6/16/2025), Disp: 1 each, Rfl: 0    SUMAtriptan Succinate 6 MG/0.5ML SOAJ, Inject 0.5 mL (6 mg total) under the skin as needed (once at onset of migraine) May repeat 6 mg once in 2 hours if needed;  max 12 mg/day, max 3 days per week, Disp: 3 mL, Rfl: 3  [2]   Past Medical History:  Diagnosis Date    ADHD (attention deficit hyperactivity disorder)     Allergic     Anemia     Anxiety     Asthma     Clotting disorder (HCC)     Migraines     Otitis media     Urinary tract infection    [3]   Past Surgical History:  Procedure Laterality Date    ADENOIDECTOMY      NASAL SEPTUM SURGERY      TONSILLECTOMY     [4]   Family History  Problem Relation Name Age of Onset    Migraines Mother Magui Gustafson     Seizures Mother Magui Gustafson     Mental illness Mother Magui Gustafson     Anxiety disorder Mother Magui Gustafson     Asthma Father Aleksandar Gustafson Jr     Stroke Father Aleksandar Gustafson Jr     ADD / ADHD Father Aleksandar Gustafson Jr     Diabetes Maternal Grandfather Aashish Moreno     Pulmonary embolism Maternal Grandmother Olga Moreno     Miscarriages / Stillbirths Maternal Grandmother Olga Moreno     Cancer Maternal Grandmother Olga Moreno     Asthma Paternal Grandmother Melanie Gustafson     Migraines Paternal Grandmother Melanie Gustafson     Autoimmune disease Paternal Grandmother Melanie Gustafson

## 2025-06-26 NOTE — LETTER
June 26, 2025     Patient: Carola Gustafson   YOB: 2005   Date of Visit: 6/26/2025       To Whom it May Concern:    Carola Gustafson was seen in my clinic on 6/26/2025. She may return to school on 06/27.    If you have any questions or concerns, please don't hesitate to call.         Sincerely,          Patricio Lebron MD        CC: No Recipients

## 2025-07-09 DIAGNOSIS — F41.9 ANXIETY: ICD-10-CM

## 2025-07-09 DIAGNOSIS — F42.2 MIXED OBSESSIONAL THOUGHTS AND ACTS: ICD-10-CM

## 2025-07-16 PROBLEM — H66.001 NON-RECURRENT ACUTE SUPPURATIVE OTITIS MEDIA OF RIGHT EAR WITHOUT SPONTANEOUS RUPTURE OF TYMPANIC MEMBRANE: Status: RESOLVED | Noted: 2025-06-16 | Resolved: 2025-07-16

## 2025-08-08 ENCOUNTER — APPOINTMENT (EMERGENCY)
Dept: RADIOLOGY | Facility: HOSPITAL | Age: 20
End: 2025-08-08
Payer: OTHER MISCELLANEOUS

## 2025-08-08 ENCOUNTER — HOSPITAL ENCOUNTER (EMERGENCY)
Facility: HOSPITAL | Age: 20
Discharge: HOME | End: 2025-08-08
Attending: EMERGENCY MEDICINE
Payer: OTHER MISCELLANEOUS

## 2025-08-10 ENCOUNTER — APPOINTMENT (OUTPATIENT)
Dept: URGENT CARE | Facility: CLINIC | Age: 20
End: 2025-08-10
Payer: OTHER MISCELLANEOUS

## 2025-08-19 ENCOUNTER — APPOINTMENT (OUTPATIENT)
Dept: URGENT CARE | Facility: CLINIC | Age: 20
End: 2025-08-19
Payer: OTHER MISCELLANEOUS

## 2025-08-19 PROCEDURE — 99214 OFFICE O/P EST MOD 30 MIN: CPT | Performed by: STUDENT IN AN ORGANIZED HEALTH CARE EDUCATION/TRAINING PROGRAM
